# Patient Record
Sex: FEMALE | Race: WHITE | NOT HISPANIC OR LATINO | Employment: FULL TIME | URBAN - METROPOLITAN AREA
[De-identification: names, ages, dates, MRNs, and addresses within clinical notes are randomized per-mention and may not be internally consistent; named-entity substitution may affect disease eponyms.]

---

## 2023-02-19 ENCOUNTER — HOSPITAL ENCOUNTER (EMERGENCY)
Facility: HOSPITAL | Age: 43
Discharge: HOME | End: 2023-02-19
Attending: EMERGENCY MEDICINE

## 2023-02-19 ENCOUNTER — APPOINTMENT (EMERGENCY)
Dept: RADIOLOGY | Facility: HOSPITAL | Age: 43
End: 2023-02-19

## 2023-02-19 VITALS
OXYGEN SATURATION: 99 % | HEART RATE: 64 BPM | RESPIRATION RATE: 16 BRPM | BODY MASS INDEX: 45.57 KG/M2 | DIASTOLIC BLOOD PRESSURE: 74 MMHG | WEIGHT: 257.28 LBS | SYSTOLIC BLOOD PRESSURE: 160 MMHG | TEMPERATURE: 98.6 F

## 2023-02-19 DIAGNOSIS — M79.641 RIGHT HAND PAIN: Primary | ICD-10-CM

## 2023-02-19 RX ORDER — ACETAMINOPHEN 325 MG/1
975 TABLET ORAL ONCE
Status: COMPLETED | OUTPATIENT
Start: 2023-02-19 | End: 2023-02-19

## 2023-02-19 RX ORDER — KETOROLAC TROMETHAMINE 30 MG/ML
30 INJECTION, SOLUTION INTRAMUSCULAR; INTRAVENOUS ONCE
Status: COMPLETED | OUTPATIENT
Start: 2023-02-19 | End: 2023-02-19

## 2023-02-19 RX ADMIN — KETOROLAC TROMETHAMINE 30 MG: 30 INJECTION, SOLUTION INTRAMUSCULAR at 01:10

## 2023-02-19 RX ADMIN — ACETAMINOPHEN 975 MG: 325 TABLET ORAL at 01:10

## 2023-02-19 NOTE — DISCHARGE INSTRUCTIONS
Continue to monitor your right hand pain  As discussed, take 600 mg of ibuprofen 3 times daily for the next 2 days  Use 650 mg of Tylenol 3 times a day for the next 2 days  Return to the ER if you develop fever, worsening pain, new swelling/redness/warmth to the affected area, numbness or tingling, or if you develop a new rash

## 2023-02-19 NOTE — ED PROVIDER NOTES
History  Chief Complaint   Patient presents with   • Hand Pain     Pt C/O R hand pain starting tonight  Denies any injury to hand      Patient is a 51-year-old female presenting for evaluation of R hand pain  Patient notes that she was awoken by sleep tonight due to pain to her right hand between the thumb and index finger  She denies trauma to the hand  She describes a "deep", sharp, constant pain that spikes intermittently with what is described as a nerve like pain  She denies fevers, chills, sweats, redness/warmth to the affected area, and IVDA  She denies prior injury to the hand but notes a few weeks ago a possible "torque" injury when twisting something  She has intermittently had mild pain to the affected area but notes the intensity/severity of tonight's pain is new and significantly different  She notes 3 weeks of rash to the top of the right hand and parts of the left hand and is uncertain if that may be related  She notes intermittent rashes throughout her body which has been investigated by allergy and dermatology specialists  She is right hand dominant  History provided by:  Patient   used: No        None       History reviewed  No pertinent past medical history  History reviewed  No pertinent surgical history  History reviewed  No pertinent family history  I have reviewed and agree with the history as documented  E-Cigarette/Vaping     E-Cigarette/Vaping Substances     Social History     Tobacco Use   • Smoking status: Never   • Smokeless tobacco: Never   Substance Use Topics   • Alcohol use: Yes     Comment: socially   • Drug use: Never       Review of Systems   Constitutional: Negative for chills and fever  Musculoskeletal: Positive for joint swelling (small amount of swelling to area of pain on R hand between thumb and index finger)  Negative for arthralgias, back pain, gait problem, neck pain and neck stiffness     Skin: Positive for rash (to tops of left and right hands)  Negative for color change and wound  Allergic/Immunologic: Negative for immunocompromised state  Physical Exam  Physical Exam  Vitals and nursing note reviewed  Constitutional:       General: She is in acute distress (evidencing mild distress)  Appearance: Normal appearance  She is normal weight  She is not ill-appearing, toxic-appearing or diaphoretic  HENT:      Head: Normocephalic and atraumatic  Jaw: There is normal jaw occlusion  Nose: Nose normal       Mouth/Throat:      Lips: Pink  Mouth: Mucous membranes are moist    Eyes:      Extraocular Movements: Extraocular movements intact  Conjunctiva/sclera: Conjunctivae normal    Cardiovascular:      Rate and Rhythm: Normal rate  Pulses: Normal pulses  Radial pulses are 2+ on the right side and 2+ on the left side  Pulmonary:      Effort: Pulmonary effort is normal  No respiratory distress  Musculoskeletal:         General: Normal range of motion  Right shoulder: Normal       Left shoulder: Normal       Right elbow: Normal       Left elbow: Normal       Right wrist: Normal       Left wrist: Normal       Right hand: Swelling (scant swelling to area marked on image) present  No deformity, tenderness or bony tenderness  Normal range of motion  Normal strength  Normal sensation  There is no disruption of two-point discrimination  Normal capillary refill  Normal pulse  Left hand: Normal         Hands:       Cervical back: Normal range of motion and neck supple  Skin:     General: Skin is warm and dry  Capillary Refill: Capillary refill takes less than 2 seconds  Findings: No wound  Neurological:      General: No focal deficit present  Mental Status: She is alert and oriented to person, place, and time  Mental status is at baseline  GCS: GCS eye subscore is 4  GCS verbal subscore is 5  GCS motor subscore is 6  Sensory: Sensation is intact        Motor: Motor function is intact  Vital Signs  ED Triage Vitals   Temperature Pulse Respirations Blood Pressure SpO2   02/19/23 0024 02/19/23 0024 02/19/23 0024 02/19/23 0024 02/19/23 0024   98 6 °F (37 °C) 80 16 (!) 177/86 100 %      Temp Source Heart Rate Source Patient Position - Orthostatic VS BP Location FiO2 (%)   02/19/23 0024 02/19/23 0024 02/19/23 0024 02/19/23 0157 --   Oral Monitor Sitting Right arm       Pain Score       02/19/23 0024       8           Vitals:    02/19/23 0024 02/19/23 0157   BP: (!) 177/86 160/74   Pulse: 80 64   Patient Position - Orthostatic VS: Sitting Sitting         Visual Acuity      ED Medications  Medications   acetaminophen (TYLENOL) tablet 975 mg (975 mg Oral Given 2/19/23 0110)   ketorolac (TORADOL) injection 30 mg (30 mg Intramuscular Given 2/19/23 0110)       Diagnostic Studies  Results Reviewed     None                 XR hand 3+ views RIGHT   ED Interpretation by ANDRE Elliott (02/19 0134)   No acute bony abnormality identified by me  Procedures  Procedures         ED Course                               SBIRT 22yo+    Flowsheet Row Most Recent Value   SBIRT (23 yo +)    In order to provide better care to our patients, we are screening all of our patients for alcohol and drug use  Would it be okay to ask you these screening questions? Unable to answer at this time Filed at: 02/19/2023 [facility]                    Medical Decision Making  DDx including but not limited to: sprain, strain, arthritis, carpal tunnel syndrome, radiculopathy; considered but doubt septic arthritis or fracture or cellulitis or osteomyelitis or VTE or lyme disease or gout    Patient is a 41-year-old female presenting for evaluation of 1 hour of atraumatic right hand pain  Patient is evidencing mild distress on initial exam   Her vital signs are notable for a mildly elevated BP    Physical exam is notable for a dry eczematous rash to the dorsal aspect of the right hand as well as a small area to the dorsal aspect of the left hand  There is scant swelling, however no warmth, redness, fluctuance, or mass, or other acute abnormality  She has full range of motion of the right wrist and the digits of the hand  She has full strength  Plan made for x-ray to rule out bony abnormality  ED wet read x-ray of right hand without abnormality  Given Tylenol and Toradol for which she had improvement in her pain  She was offered a splint for comfort  I suspect a musculoskeletal cause as most likely underlying pathology, however cannot rule out a neuropathy-like pathology  No evidence of cellulitis, abscess, or infection  No history of gout or erythema/warmth to suggest gout  Plan made for NSAID, Tylenol therapy at home with the use of ice/heat for comfort  Patient and her  agreeable to plan of no further questions at this time  Patient is with improved appearance, downtrending blood pressure after pain control, in no acute distress, and ambulatory with steady gait at time of discharge  Referral to Ortho provided if pain persists  Right hand pain: acute illness or injury  Amount and/or Complexity of Data Reviewed  Radiology: ordered and independent interpretation performed  Risk  OTC drugs  Prescription drug management  Disposition  Final diagnoses:   Right hand pain     Time reflects when diagnosis was documented in both MDM as applicable and the Disposition within this note     Time User Action Codes Description Comment    2/19/2023  1:51 AM Benjamin Muller Add [O00 804] Right hand pain       ED Disposition     ED Disposition   Discharge    Condition   Stable    Date/Time   Sun Feb 19, 2023  1:51 AM    Sade Herron discharge to home/self care                 Follow-up Information     Follow up With Specialties Details Why Contact Info Additional [address] [facility] Schedule an appointment as soon as possible for a visit in 2 days  [address]  [phone]       [address] [facility] Schedule an appointment as soon as possible for a visit in 2 days  [address] [facility] [address] [place] [address] [facility] [address], [address], [address] [place], [place], [place]    [address] Emergency Department Emergency Medicine Go to  If symptoms worsen [address] [facility] [address] [facility] Emergency Department, [address], [place], [address] [facility], [address]          There are no discharge medications for this patient  No discharge procedures on file      PDMP Review     None          ED Provider  Electronically Signed by           Julissa Shelley  02/19/23 8359

## 2023-09-06 ENCOUNTER — OFFICE VISIT (OUTPATIENT)
Dept: OBGYN CLINIC | Facility: CLINIC | Age: 43
End: 2023-09-06

## 2023-09-06 VITALS
HEIGHT: 65 IN | HEART RATE: 66 BPM | BODY MASS INDEX: 41.75 KG/M2 | WEIGHT: 250.6 LBS | DIASTOLIC BLOOD PRESSURE: 92 MMHG | SYSTOLIC BLOOD PRESSURE: 129 MMHG

## 2023-09-06 DIAGNOSIS — L29.2 VULVAR ITCHING: Primary | ICD-10-CM

## 2023-09-06 DIAGNOSIS — L28.0 LICHEN SIMPLEX: ICD-10-CM

## 2023-09-06 LAB
BV WHIFF TEST VAG QL: NEGATIVE
CLUE CELLS SPEC QL WET PREP: NEGATIVE
PH SMN: 4 [PH]
SL AMB POCT WET MOUNT: NORMAL
T VAGINALIS VAG QL WET PREP: NEGATIVE
YEAST VAG QL WET PREP: NEGATIVE

## 2023-09-06 PROCEDURE — 87210 SMEAR WET MOUNT SALINE/INK: CPT | Performed by: NURSE PRACTITIONER

## 2023-09-06 PROCEDURE — 99203 OFFICE O/P NEW LOW 30 MIN: CPT | Performed by: NURSE PRACTITIONER

## 2023-09-06 RX ORDER — CETIRIZINE HYDROCHLORIDE 5 MG/1
5 TABLET ORAL DAILY
COMMUNITY

## 2023-09-06 RX ORDER — MELATONIN
1000 DAILY
COMMUNITY

## 2023-09-06 RX ORDER — CARVEDILOL 6.25 MG/1
6.25 TABLET ORAL 2 TIMES DAILY WITH MEALS
COMMUNITY

## 2023-09-06 RX ORDER — TRIAMCINOLONE ACETONIDE 1 MG/G
CREAM TOPICAL 2 TIMES DAILY
Qty: 80 G | Refills: 1 | Status: SHIPPED | OUTPATIENT
Start: 2023-09-06

## 2023-09-06 NOTE — PROGRESS NOTES
PROBLEM GYNECOLOGICAL VISIT    Beryle Knoll is a 43 y.o. female who presents today with complaint of vulvar itching. Her general medical history has been reviewed and she reports it as follows:    History reviewed. No pertinent past medical history. History reviewed. No pertinent surgical history. OB History        0    Para   0    Term   0       0    AB   0    Living   0       SAB   0    IAB   0    Ectopic   0    Multiple   0    Live Births   0               Social History     Tobacco Use   • Smoking status: Never   • Smokeless tobacco: Never   Substance Use Topics   • Alcohol use: Not Currently     Comment: socially   • Drug use: Never     Social History     Substance and Sexual Activity   Sexual Activity Yes   • Partners: Male   • Birth control/protection: None, Male Sterilization       Current Outpatient Medications   Medication Instructions   • carvedilol (COREG) 6.25 mg, Oral, 2 times daily with meals   • cetirizine (ZYRTEC) 5 mg, Oral, Daily   • cholecalciferol (VITAMIN D3) 1,000 Units, Oral, Daily       History of Present Illness:   Nevaeh Louie presents today reporting issues with vulvar itching. This has been occurring for approximately 6 months. She initially though that she had a yeast infection and tried OTC monistat, which did not relieve the itching and caused worsening burning/irritation. She was given an Rx for Diflucan by her PCP which did not improve the itching. She denies any abnormal vaginal discharge or odor. She has been in a long term monogamous relationship with her  of many years. She denies any change in hygiene products. Review of Systems:  Review of Systems   Constitutional: Negative. Gastrointestinal: Negative. Genitourinary: Negative for dysuria, frequency, vaginal discharge and vaginal pain.         Vulvar itching        Physical Exam:  /92 (BP Location: Right arm)   Pulse 66   Ht 5' 5" (1.651 m)   Wt 114 kg (250 lb 9.6 oz)   LMP 2023 (Exact Date)   BMI 41.70 kg/m²   Physical Exam  Constitutional:       General: She is not in acute distress. Appearance: Normal appearance. Genitourinary:      No lesions in the vagina. No vaginal discharge, erythema, tenderness, bleeding or ulceration. No cervical lesion. Neurological:      Mental Status: She is alert. Skin:     General: Skin is warm and dry. Psychiatric:         Mood and Affect: Mood normal.         Behavior: Behavior normal.   Vitals reviewed. Point of Care Testing:   -Wet mount: -yeast, -clue cells   -KOH mount: -yeast   -Whiff: negative    -pH 4    Assessment:   1. Vulvar itching    2. Lichen simplex     Plan:   1. Discussed itch/scratch cycle causing long term skin irritation. 2. Discussed skin irritants and potential triggers, such as excess sweating and dampness in the area. She has been helping to care for her mother in law over this year and states that her home is very hot, which she has noticed has created excess sweating/mositure. 3. Will begin topical steroids twice per day over the next 3 weeks, then tapering back off.    4. She is scheduled later this month for her annual exam with the OBGYN office she typically sees near her home in [place], will plan to follow up for symptoms at that time. Reviewed with patient that test results are available in [email or web address] immediately, but that they will not necessarily be reviewed by me immediately. Explained that I will review results at my earliest opportunity and contact patient appropriately.

## 2024-01-23 ENCOUNTER — HOSPITAL ENCOUNTER (EMERGENCY)
Facility: HOSPITAL | Age: 44
Discharge: HOME | End: 2024-01-23
Attending: EMERGENCY MEDICINE
Payer: COMMERCIAL

## 2024-01-23 VITALS
SYSTOLIC BLOOD PRESSURE: 184 MMHG | OXYGEN SATURATION: 99 % | HEART RATE: 69 BPM | RESPIRATION RATE: 20 BRPM | DIASTOLIC BLOOD PRESSURE: 80 MMHG | TEMPERATURE: 97.6 F

## 2024-01-23 DIAGNOSIS — M54.30 SCIATICA: Primary | ICD-10-CM

## 2024-01-23 PROCEDURE — 99282 EMERGENCY DEPT VISIT SF MDM: CPT

## 2024-01-23 PROCEDURE — 96372 THER/PROPH/DIAG INJ SC/IM: CPT

## 2024-01-23 PROCEDURE — 99284 EMERGENCY DEPT VISIT MOD MDM: CPT | Performed by: EMERGENCY MEDICINE

## 2024-01-23 RX ORDER — KETOROLAC TROMETHAMINE 30 MG/ML
15 INJECTION, SOLUTION INTRAMUSCULAR; INTRAVENOUS ONCE
Status: COMPLETED | OUTPATIENT
Start: 2024-01-23 | End: 2024-01-23

## 2024-01-23 RX ORDER — LIDOCAINE 50 MG/G
1 PATCH TOPICAL ONCE
Status: DISCONTINUED | OUTPATIENT
Start: 2024-01-23 | End: 2024-01-24 | Stop reason: HOSPADM

## 2024-01-23 RX ADMIN — KETOROLAC TROMETHAMINE 15 MG: 30 INJECTION, SOLUTION INTRAMUSCULAR; INTRAVENOUS at 22:49

## 2024-01-23 RX ADMIN — LIDOCAINE 1 PATCH: 700 PATCH TOPICAL at 22:50

## 2024-01-24 ENCOUNTER — NURSE TRIAGE (OUTPATIENT)
Dept: PHYSICAL THERAPY | Facility: OTHER | Age: 44
End: 2024-01-24

## 2024-01-24 DIAGNOSIS — M54.42 ACUTE LEFT-SIDED LOW BACK PAIN WITH LEFT-SIDED SCIATICA: Primary | ICD-10-CM

## 2024-01-24 NOTE — TELEPHONE ENCOUNTER
"Patient called into [facility] today and left v/m @7:06am. In regards her back/sciatica pain .    Returned patient's call @8:10am.      Additional Information   Negative: Is this related to a work injury?   Negative: Is this related to an MVA?   Negative: Are you currently recieving homecare services?    Background - Initial Assessment  Clinical complaint: ED visit yesterday 01/23 due to Left Sided lower Back Pain. Patient states this is new, pain started in the morning  radiating into her buttock \"a little bit\", mostly in her hip and down to mid-thigh. No numbness or tingling . NKI. Not seeing a Dr for this pain. Patient states pain is constant, worse with certain movement. Patient described pain as sharp and this morning felt stiff when she woke up.   Date of onset: 01/23/24  Frequency of pain: constant  Quality of pain: sharp and stiff.    Protocols used: [facility] Protocol    "

## 2024-01-24 NOTE — TELEPHONE ENCOUNTER
Additional Information   Negative: Has the patient had unexplained weight loss?   Negative: Does the patient have a fever?   Negative: Is the patient experiencing urine retention?   Negative: Is the patient experiencing acute drop foot or paralysis?   Negative: Has the patient experienced major trauma? (fall from height, high speed collision, direct blow to spine) and is also experiencing nausea, light-headedness, or loss of consciousness?   Negative: Is the patient experiencing blood in sputum?   Negative: Is this a chronic condition?    Protocols used: [facility] Protocol    This RN did review in detail the [facility] and what we can provide for their back pain.  Patient is agreeable to being triaged by this RN and would like to proceed with Physical Therapy.    Referral was placed for Physical Therapy at the [place] site. Patients information was sent to the  to make evaluation appointment. Patient made aware that the PT office  will be calling to schedule the appointment.  Patient was provided with the phone number to the PT office.    No further questions and/or concerns were voiced by the patient at this time. Patient states understanding of the referral that was placed.    Referral Closed.

## 2024-01-24 NOTE — ED ATTENDING ATTESTATION
1/23/2024  IMelani MD, saw and evaluated the patient. I have discussed the patient with the resident/non-physician practitioner and agree with the resident's/non-physician practitioner's findings, Plan of Care, and MDM as documented in the resident's/non-physician practitioner's note, except where noted. All available labs and Radiology studies were reviewed.  I was present for key portions of any procedure(s) performed by the resident/non-physician practitioner and I was immediately available to provide assistance.       At this point I agree with the current assessment done in the Emergency Department.  I have conducted an independent evaluation of this patient a history and physical is as follows:    43-year-old presenting with lower back pain radiating down the leg.  No trauma.  No falls.  No fevers.  No IV drug use.  No abdominal pain.  No bowel or urine incontinence.  No weakness.  No groin paresthesias.    Neuro intact on exam.    Patient with sciatica.  Symptomatic control.  Discussed importance of following up with spine program.  Patient verbalized understanding.  Strict return precautions for signs of cauda equina explained to patient    ED Course         Critical Care Time  Procedures

## 2024-01-24 NOTE — ED PROVIDER NOTES
History  Chief Complaint   Patient presents with    Back Pain     Pt reports left lower back pain. Seen at urgent care and given Toradol and muscle relaxant, dx with sciatica. Pt had a little relief. Tonight when pt got up to go up to bed 10/10 pain, pain is worse with movement. Denies difficulty urinating.      HPI  Patient is a 43-year-old female with past medical history of hypertension.  She is presenting with 1 day of lower left-sided back pain.  She states that earlier this morning she began having back pain after doing daily tasks.  She went to [facility] urgent care where they gave her Toradol and Robaxin for pain relief.  This was effective.  Later on in the evening at home her back pain came back and was described as a 10 out of 10 back pain with radiation down the left leg to mid thigh.  She is denying any saddle paresthesias, urinary incontinence, or fecal incontinence.  She is denying weakness.  She is denying fever or any recent illnesses.  She recently traveled to [place] last week and returned Sunday but did not note any back pain following her travels.    Prior to Admission Medications   Prescriptions Last Dose Informant Patient Reported? Taking?   carvedilol (COREG) 6.25 mg tablet  Self Yes No   Sig: Take 6.25 mg by mouth 2 (two) times a day with meals   cetirizine (ZyrTEC) 5 MG tablet  Self Yes No   Sig: Take 5 mg by mouth daily   cholecalciferol (VITAMIN D3) 1,000 units tablet  Self Yes No   Sig: Take 1,000 Units by mouth daily   triamcinolone (KENALOG) 0.1 % cream   No No   Sig: Apply topically 2 (two) times a day Apply topically 2 times a day for three weeks, followed by once a day for a week, followed by every other day for one week.      Facility-Administered Medications: None       History reviewed. No pertinent past medical history.    History reviewed. No pertinent surgical history.    History reviewed. No pertinent family history.  I have reviewed and agree with the history as  documented.    E-Cigarette/Vaping     E-Cigarette/Vaping Substances     Social History     Tobacco Use    Smoking status: Never    Smokeless tobacco: Never   Substance Use Topics    Alcohol use: Not Currently     Comment: socially    Drug use: Never        Review of Systems   Constitutional:  Negative for chills, fatigue and fever.   HENT:  Negative for congestion, rhinorrhea and sore throat.    Respiratory:  Negative for cough and shortness of breath.    Cardiovascular:  Negative for chest pain and leg swelling.   Gastrointestinal:  Negative for abdominal pain, constipation, diarrhea, nausea and vomiting.   Genitourinary:  Negative for dysuria, enuresis, frequency and urgency.   Musculoskeletal:  Positive for back pain.   Neurological:  Negative for dizziness, weakness, numbness and headaches.       Physical Exam  ED Triage Vitals [01/23/24 2139]   Temperature Pulse Respirations Blood Pressure SpO2   97.6 °F (36.4 °C) 69 20 (!) 184/80 99 %      Temp Source Heart Rate Source Patient Position - Orthostatic VS BP Location FiO2 (%)   Oral Monitor -- Right arm --      Pain Score       6             Orthostatic Vital Signs  Vitals:    01/23/24 2139   BP: (!) 184/80   Pulse: 69       Physical Exam  Constitutional:       Appearance: She is obese.   Cardiovascular:      Rate and Rhythm: Normal rate and regular rhythm.      Heart sounds: Normal heart sounds.   Pulmonary:      Effort: Pulmonary effort is normal.      Breath sounds: Normal breath sounds.   Musculoskeletal:      Lumbar back: No swelling, edema, deformity, spasms, tenderness or bony tenderness. Normal range of motion.   Neurological:      General: No focal deficit present.      Mental Status: She is alert and oriented to person, place, and time.      Motor: No weakness.      Gait: Gait normal.         ED Medications  Medications   lidocaine (LIDODERM) 5 % patch 1 patch (1 patch Topical Medication Applied 1/23/24 2250)   ketorolac (TORADOL) injection 15 mg (15 mg  Intramuscular Given 1/23/24 7103)       Diagnostic Studies  Results Reviewed       None                   No orders to display         Procedures  Procedures      ED Course                                       Medical Decision Making  Risk  Prescription drug management.          Disposition  Final diagnoses:   Sciatica     Time reflects when diagnosis was documented in both MDM as applicable and the Disposition within this note       Time User Action Codes Description Comment    1/23/2024 10:37 PM Denys Steele Add [M54.30] Sciatica           ED Disposition       ED Disposition   Discharge    Condition   Stable    Date/Time   Tue Jan 23, 2024 10:57 PM    Comment   Abby Jonas discharge to home/self care.                   Follow-up Information       Follow up With Specialties Details Why Contact Info Additional Information    Roosevelt Ramirez MD Internal Medicine Schedule an appointment as soon as possible for a visit in 1 week  [address] [place] [address]  [phone]       [facility] Emergency Department Emergency Medicine Go to  As needed, If symptoms worsen [address] [place] [id number]  [phone] [facility] Emergency Department, [address] [place] [address], [facility]            Discharge Medication List as of 1/23/2024 10:57 PM        CONTINUE these medications which have NOT CHANGED    Details   carvedilol (COREG) 6.25 mg tablet Take 6.25 mg by mouth 2 (two) times a day with meals, Historical Med      cetirizine (ZyrTEC) 5 MG tablet Take 5 mg by mouth daily, Historical Med      cholecalciferol (VITAMIN D3) 1,000 units tablet Take 1,000 Units by mouth daily, Historical Med      triamcinolone (KENALOG) 0.1 % cream Apply topically 2 (two) times a day Apply topically 2 times a day for three weeks, followed by once a day for a week, followed by every other day for one week., Starting Wed 9/6/2023,  Normal               PDMP Review       None             ED Provider  Attending physically available and evaluated Abby Jonas. I managed the patient along with the ED Attending.    Electronically Signed by           Denys Steele,   01/23/24 4914       Denys Steele,   01/23/24 1940

## 2024-02-02 ENCOUNTER — EVALUATION (OUTPATIENT)
Dept: PHYSICAL THERAPY | Facility: CLINIC | Age: 44
End: 2024-02-02
Payer: COMMERCIAL

## 2024-02-02 DIAGNOSIS — M54.42 ACUTE LEFT-SIDED LOW BACK PAIN WITH LEFT-SIDED SCIATICA: Primary | ICD-10-CM

## 2024-02-02 PROCEDURE — 97161 PT EVAL LOW COMPLEX 20 MIN: CPT | Performed by: PHYSICAL THERAPIST

## 2024-02-02 PROCEDURE — 97110 THERAPEUTIC EXERCISES: CPT | Performed by: PHYSICAL THERAPIST

## 2024-02-02 NOTE — PROGRESS NOTES
PT Evaluation     Today's date: 2024  Patient name: Abby Jonas  : 1980  MRN: [id number]  Referring provider: No ref. provider found  Dx:   Encounter Diagnosis     ICD-10-CM    1. Acute left-sided low back pain with left-sided sciatica  M54.42                      Assessment  Assessment details: Patient presents with signs and symptoms consistent with mechanical back pain and L sided posterior derangement.  Initially she presents with a likely relevant L lateral compartment with most improvement seen with L Sidegliding movements.   Positive prognostic indicators include:  Motivated to improve, symptoms/presentation improved somewhat today.  Negative prognostic indicators include: (-0)  She presents with: pain, decreased: ROM, strength and  functional capacity.  She requires skilled PT to address these deficits and restore maximal functional capacity.  Patient presents as part of the [facility].    Impairments: abnormal or restricted ROM, activity intolerance, impaired physical strength, lacks appropriate home exercise program and pain with function  Understanding of Dx/Px/POC: good   Prognosis: good    Goals  ST-6 weeks  1.  Patient to be independent with HEP.  2.  Decrease pain at least 2 subjective levels.      LT-12 weeks.    1.   Patient to voice comfort with self management of condition.  2.  75% or > decreased pain.  3.  75% or > decreased functional deficits.  4.  Normalize AROM of all deficit planes.  7.  Patient to voice understanding of activities/positions to avoid.  8.  Centralize symptoms.      Plan  Patient would benefit from: skilled PT  Referral necessary: No  Planned modality interventions: cryotherapy  Planned therapy interventions: IADL retraining, joint mobilization, manual therapy, motor coordination training, neuromuscular re-education, patient education, postural training, self care, strengthening, stretching, therapeutic activities, therapeutic  "exercise, home exercise program, flexibility, ADL training, balance and body mechanics training  Frequency: 2x week  Duration in weeks: 6  Treatment plan discussed with: patient        Subjective Evaluation    History of Present Illness  Onset date: 24.  Mechanism of injury: Chief Complaint:  L LBP    History:  Pt had sharp onset of L sided LBP with turning/bending with laundry.  She was seen in ER, had a Torodol injection.  No imaging and was referred to PT via [facility].  Symptoms have improved since onset.  She denies prior history or treatment.  She works from home with some sitting    PMH: (-)     Aggravating factors: sitting, prolonged positioning, upon waking  Relieving factors:  \"I haven't figured it out\"    Functional Deficits:  riding [employer], walking for fitness (< 1 mile, wants to go 4-5)    Patient Goals:  \"get rid of pain\"    Quality of life: good    Pain  At best pain ratin  At worst pain ratin  Location: L LBP at times L LE to lateral shin          Objective     Postural Observations  Seated posture: fair  Correction of posture: has no consistent effect      Active Range of Motion     Lumbar   Flexion:  WFL  Extension:  Restriction level: moderate    Additional Active Range of Motion Details  B sideglide WFL    Joint Play     Pain: L3, L4, L5 and S1   Mechanical Assessment    Cervical      Thoracic    Lying extension: repeated movements  Pain intensity: worse  Pain level: increased    Lumbar    Left Sidegliding: repeated movements  Pain intensity: better  Pain level: decreased    General Comments:      Lumbar Comments  (-) Neuromotor exam  (-) Hip Screen  (-) TULOI/FADIR             Precautions: (-)      Manuals 2/2            L Flex Rot prn             L UPA prn             Prone Lx PA Mobs prn                          Neuro Re-Ed             Posture Ed/Lx Roll                                                                                           Ther Ex           "   HEP: L SGIS 1x10 every 2 hrs             EIL             EIL L SG             EIL pt OP             EIL L SG pt OP             Sust Ext (L SG)             EIS             EIS Belt OP             L SGIS             Ther Activity                                       Gait Training                                       Modalities

## 2024-02-05 ENCOUNTER — OFFICE VISIT (OUTPATIENT)
Dept: PHYSICAL THERAPY | Facility: CLINIC | Age: 44
End: 2024-02-05
Payer: COMMERCIAL

## 2024-02-05 DIAGNOSIS — M54.42 ACUTE LEFT-SIDED LOW BACK PAIN WITH LEFT-SIDED SCIATICA: Primary | ICD-10-CM

## 2024-02-05 PROCEDURE — 97110 THERAPEUTIC EXERCISES: CPT | Performed by: PHYSICAL THERAPIST

## 2024-02-05 NOTE — PROGRESS NOTES
Daily Note     Today's date: 2024  Patient name: Abyb Jonas  : 1980  MRN: [id number]  Referring provider: Phil Suresh, PT  Dx:   Encounter Diagnosis     ICD-10-CM    1. Acute left-sided low back pain with left-sided sciatica  M54.42                      Subjective: Pt feeling nearly 100% better, virtually pain free since IE      Objective: See treatment diary below.  Pt ed in HEP (EIL 1x10 every 2-3 hrs) as well as posture and self management (back account)      Assessment: Tolerated treatment well. Patient would benefit from continued PT      Plan: Continue per plan of care.      Precautions: (-)      Manuals / 2           L Flex Rot prn             L UPA prn             Prone Lx PA Mobs prn                          Neuro Re-Ed             Posture Ed/Lx Roll  Pt ed                                                                                         Ther Ex             HEP: L SGIS 1x10 every 2 hrs  EIL 1x10 every 2-3 hrs           EIL  20x           EIL L SG             EIL pt OP  10x           EIL L SG pt OP             Sust Ext (L SG)             EIS             EIS Belt OP             L SGIS             Ther Activity                                       Gait Training                                       Modalities                                             84F yo with pmhx of CAD s/p CABG x 3, HTN, HLD, NIDDM, AF s/p PPM on Eliquis, TAVR on 2/18, COPD (home O2), hypothyroidism who presents with symptoms of TIA/stroke, rt sided weakness. exam remarkable for nml s1s2 no mrg, cta bl no wrr sntnd+bs no cce weakness RLE>RUE, aaox3 but mildly confused. r/o leaflet thrombosis despite eliquis with questionable compliance with TTE/MOHINI; EP interrogation of PPM; neuro w/u pending.

## 2024-02-08 ENCOUNTER — OFFICE VISIT (OUTPATIENT)
Dept: INTERNAL MEDICINE CLINIC | Facility: CLINIC | Age: 44
End: 2024-02-08
Payer: COMMERCIAL

## 2024-02-08 VITALS
TEMPERATURE: 97 F | HEART RATE: 73 BPM | OXYGEN SATURATION: 99 % | BODY MASS INDEX: 42.49 KG/M2 | SYSTOLIC BLOOD PRESSURE: 156 MMHG | WEIGHT: 255 LBS | HEIGHT: 65 IN | DIASTOLIC BLOOD PRESSURE: 90 MMHG

## 2024-02-08 DIAGNOSIS — E03.9 ACQUIRED HYPOTHYROIDISM: ICD-10-CM

## 2024-02-08 DIAGNOSIS — E66.01 CLASS 3 SEVERE OBESITY DUE TO EXCESS CALORIES WITHOUT SERIOUS COMORBIDITY WITH BODY MASS INDEX (BMI) OF 40.0 TO 44.9 IN ADULT (HCC): ICD-10-CM

## 2024-02-08 DIAGNOSIS — E55.9 VITAMIN D DEFICIENCY: ICD-10-CM

## 2024-02-08 DIAGNOSIS — Z00.00 LABORATORY EXAMINATION ORDERED AS PART OF A ROUTINE GENERAL MEDICAL EXAMINATION: ICD-10-CM

## 2024-02-08 DIAGNOSIS — I10 ESSENTIAL HYPERTENSION: ICD-10-CM

## 2024-02-08 DIAGNOSIS — Z12.31 ENCOUNTER FOR SCREENING MAMMOGRAM FOR BREAST CANCER: ICD-10-CM

## 2024-02-08 DIAGNOSIS — I49.3 PVC (PREMATURE VENTRICULAR CONTRACTION): ICD-10-CM

## 2024-02-08 DIAGNOSIS — M54.42 ACUTE LEFT-SIDED LOW BACK PAIN WITH LEFT-SIDED SCIATICA: Primary | ICD-10-CM

## 2024-02-08 DIAGNOSIS — Z71.9 HEALTH COUNSELING: ICD-10-CM

## 2024-02-08 DIAGNOSIS — K21.9 GASTROESOPHAGEAL REFLUX DISEASE, UNSPECIFIED WHETHER ESOPHAGITIS PRESENT: ICD-10-CM

## 2024-02-08 DIAGNOSIS — Z79.899 ENCOUNTER FOR LONG-TERM CURRENT USE OF MEDICATION: ICD-10-CM

## 2024-02-08 PROCEDURE — 99204 OFFICE O/P NEW MOD 45 MIN: CPT | Performed by: INTERNAL MEDICINE

## 2024-02-08 RX ORDER — LEVOTHYROXINE SODIUM 0.05 MG/1
50 TABLET ORAL DAILY
COMMUNITY
Start: 2024-01-10

## 2024-02-08 NOTE — ASSESSMENT & PLAN NOTE
BP slightly elevated today, home BP readings normal.  Instructed to monitor BP at home, taking carvedilol 6.25 mg bid.  She reports resting HR of 55. If needed, consider adding ARB, does not want diuretic.

## 2024-02-08 NOTE — PROGRESS NOTES
Assessment/Plan:    Acquired hypothyroidism  Stable.    GERD (gastroesophageal reflux disease)  Occasional symptoms.    Essential hypertension  BP slightly elevated today, home BP readings normal.  Instructed to monitor BP at home, taking carvedilol 6.25 mg bid.  She reports resting HR of 55. If needed, consider adding ARB, does not want diuretic.    Vitamin D deficiency  Taking D3.    Class 3 severe obesity due to excess calories without serious comorbidity with body mass index (BMI) of 40.0 to 44.9 in adult ([facility])  Gained weight in the last year or so.  Resume regular exercise, monitor calorie intake.  Discussed importance of regular meals and adequate sleep.    PVC (premature ventricular contraction)  Intermittent symptoms. On carvedilol.       Diagnoses and all orders for this visit:    Acute left-sided low back pain with left-sided sciatica  Comments:  Improved, went to physical therapy. Remineded of proper body mechanics.    Encounter for screening mammogram for breast cancer  -     Mammo screening bilateral w 3d & cad; Future    Laboratory examination ordered as part of a routine general medical examination  -     CBC and differential; Future  -     Comprehensive metabolic panel; Future  -     Lipid panel; Future  -     TSH, 3rd generation with Free T4 reflex; Future  -     Vitamin B12; Future  -     Vitamin D 25 hydroxy; Future    Essential hypertension  -     CBC and differential; Future  -     Comprehensive metabolic panel; Future  -     Lipid panel; Future    Acquired hypothyroidism  -     TSH, 3rd generation with Free T4 reflex; Future    Vitamin D deficiency  -     Vitamin D 25 hydroxy; Future    Class 3 severe obesity due to excess calories without serious comorbidity with body mass index (BMI) of 40.0 to 44.9 in adult ([facility])    Encounter for long-term current use of medication  -     Vitamin B12; Future    Gastroesophageal reflux disease, unspecified whether esophagitis present    PVC (premature  ventricular contraction)    Health counseling  Comments:  Vaccinations updated. Mammogram due in the fall.    Other orders  -     levothyroxine 50 mcg tablet; Take 50 mcg by mouth daily      Follow up in 6 months or as needed.      Subjective:      Patient ID: Abby Jonas is a 43 y.o. female here f    She was seen at the ER for back pain.  She thinks she may have developed this since she was lifting a lot of boxes since the move to the area permanently a few months ago. She reports pain has significantly improved after 2 sessions of physical therapy.    She is worried about her blood pressure.  She reports high readings when she is at the ER or at the doctor's office.  At home, she reports blood pressure would range between 130-135/80-85.    She has history of PVC's.  She experiences palpitations occasionally.  She was started on a beta-blocker to help with this.  Denies any chest pain or pressure, no shortness of breath.    She continues to struggle with her weight.  She reports gaining about 25 pounds since 1.5 years ago.  She had a good schedule prior to this, was exercising regularly.  She has always struggled with weight since she was a child.      The following portions of the patient's history were reviewed and updated as appropriate: allergies, current medications, past family history, past medical history, past social history, past surgical history, and problem list.    Review of Systems   Constitutional:  Negative for appetite change and fatigue.   HENT:  Negative for congestion, ear pain and postnasal drip.    Eyes:  Negative for visual disturbance.   Respiratory:  Negative for cough and shortness of breath.    Cardiovascular:  Positive for palpitations. Negative for chest pain and leg swelling.   Gastrointestinal:  Negative for abdominal pain, constipation and diarrhea.   Genitourinary:  Negative for dysuria, frequency and urgency.   Musculoskeletal:  Positive for back pain. Negative for arthralgias  "and myalgias.   Skin:  Negative for rash and wound.   Neurological:  Negative for dizziness, numbness and headaches.   Hematological:  Does not bruise/bleed easily.   Psychiatric/Behavioral:  Negative for confusion. The patient is not nervous/anxious.          Objective:      /90   Pulse 73   Temp (!) 97 °F (36.1 °C)   Ht 5' 5\" (1.651 m)   Wt 116 kg (255 lb)   SpO2 99%   BMI 42.43 kg/m²          Physical Exam  Vitals and nursing note reviewed.   Constitutional:       General: She is not in acute distress.     Appearance: She is well-developed.   HENT:      Head: Normocephalic and atraumatic.      Right Ear: Tympanic membrane, ear canal and external ear normal.      Left Ear: Tympanic membrane, ear canal and external ear normal.      Nose: Nose normal.      Mouth/Throat:      Mouth: Mucous membranes are moist.      Pharynx: Uvula midline.   Eyes:      Conjunctiva/sclera: Conjunctivae normal.      Pupils: Pupils are equal, round, and reactive to light.   Neck:      Thyroid: No thyroid mass.   Cardiovascular:      Rate and Rhythm: Normal rate and regular rhythm.      Heart sounds: Normal heart sounds.   Pulmonary:      Effort: Pulmonary effort is normal.      Breath sounds: Normal breath sounds. No wheezing or rhonchi.   Abdominal:      General: Bowel sounds are normal.      Palpations: Abdomen is soft.      Tenderness: There is no abdominal tenderness.      Hernia: No hernia is present.   Musculoskeletal:         General: Normal range of motion.      Cervical back: Neck supple.      Right lower leg: No edema.      Left lower leg: No edema.      Comments: No joint pain or swelling   Lymphadenopathy:      Cervical: No cervical adenopathy.      Upper Body:      Right upper body: No supraclavicular adenopathy.      Left upper body: No supraclavicular adenopathy.   Skin:     General: Skin is warm.      Findings: No rash.   Neurological:      General: No focal deficit present.      Mental Status: She is alert " and oriented to person, place, and time.      Cranial Nerves: No cranial nerve deficit.   Psychiatric:         Mood and Affect: Mood normal.         Behavior: Behavior normal.           Reviewed available records.

## 2024-02-08 NOTE — ASSESSMENT & PLAN NOTE
Gained weight in the last year or so.  Resume regular exercise, monitor calorie intake.  Discussed importance of regular meals and adequate sleep.

## 2024-02-09 ENCOUNTER — OFFICE VISIT (OUTPATIENT)
Dept: PHYSICAL THERAPY | Facility: CLINIC | Age: 44
End: 2024-02-09
Payer: COMMERCIAL

## 2024-02-09 DIAGNOSIS — M54.42 ACUTE LEFT-SIDED LOW BACK PAIN WITH LEFT-SIDED SCIATICA: Primary | ICD-10-CM

## 2024-02-09 PROCEDURE — 97110 THERAPEUTIC EXERCISES: CPT | Performed by: PHYSICAL THERAPIST

## 2024-02-09 NOTE — PROGRESS NOTES
Daily Note     Today's date: 2024  Patient name: Abby Jonas  : 1980  MRN: [id number]  Referring provider: Phil Suresh PT  Dx:   Encounter Diagnosis     ICD-10-CM    1. Acute left-sided low back pain with left-sided sciatica  M54.42                      Subjective: Pt has been feeling better, no pain yesterday but did exercises less frequently and awoke with pain today       Objective: See treatment diary below      Assessment: Tolerated treatment well. Patient  had no pain post end of tx, responded well to EIS today, so ed in performance of this as alternate to EIL at home.   Derangement reduced but not fully stable yet.       Plan: Continue per plan of care.      Precautions: (-)      Manuals 2/2 2/ 2          L Flex Rot prn             L UPA prn             Prone Lx PA Mobs prn                          Neuro Re-Ed             Posture Ed/Lx Roll  Pt ed                                                                                         Ther Ex             HEP: L SGIS 1x10 every 2 hrs  EIL 1x10 every 2-3 hrs EIS/EIL          EIL  20x 30x          EIL L SG             EIL pt OP  10x 10x          EIL L SG pt OP             Sust Ext (L SG)             EIS   20x          EIS Belt OP   10x          L SGIS             Ther Activity                                       Gait Training                                       Modalities

## 2024-02-12 ENCOUNTER — OFFICE VISIT (OUTPATIENT)
Dept: PHYSICAL THERAPY | Facility: CLINIC | Age: 44
End: 2024-02-12
Payer: COMMERCIAL

## 2024-02-12 DIAGNOSIS — M54.42 ACUTE LEFT-SIDED LOW BACK PAIN WITH LEFT-SIDED SCIATICA: Primary | ICD-10-CM

## 2024-02-12 PROCEDURE — 97110 THERAPEUTIC EXERCISES: CPT | Performed by: PHYSICAL THERAPIST

## 2024-02-12 NOTE — PROGRESS NOTES
Daily Note     Today's date: 2024  Patient name: Abby Jonas  : 1980  MRN: [id number]  Referring provider: Phil Suresh PT  Dx:   Encounter Diagnosis     ICD-10-CM    1. Acute left-sided low back pain with left-sided sciatica  M54.42                      Subjective: Pt had some pain Sat, but felt better yesterday.  1-2/10 pain to start today.  Pain worse upon waking, decreased sitting tolerance       Objective: See treatment diary below      Assessment: Tolerated treatment well. Patient would benefit from continued PT.  Pain free post tx.        Plan: Continue per plan of care.      Precautions: (-)      Manuals          L Flex Rot prn             L UPA prn             Prone Lx PA Mobs prn                          Neuro Re-Ed             Posture Ed/Lx Roll  Pt ed                                                                                         Ther Ex             HEP: L SGIS 1x10 every 2 hrs  EIL 1x10 every 2-3 hrs EIS/EIL          EIL  20x 30x 30x         EIL L SG             EIL pt OP  10x 10x          EIL L SG pt OP             Sust Ext (L SG)    5 min         EIS   20x 20x         EIS Belt OP   10x 20x         L SGIS             Ther Activity                                       Gait Training                                       Modalities

## 2024-02-14 ENCOUNTER — OFFICE VISIT (OUTPATIENT)
Dept: PHYSICAL THERAPY | Facility: CLINIC | Age: 44
End: 2024-02-14
Payer: COMMERCIAL

## 2024-02-14 DIAGNOSIS — M54.42 ACUTE LEFT-SIDED LOW BACK PAIN WITH LEFT-SIDED SCIATICA: Primary | ICD-10-CM

## 2024-02-14 PROCEDURE — 97110 THERAPEUTIC EXERCISES: CPT | Performed by: PHYSICAL THERAPIST

## 2024-02-14 NOTE — PROGRESS NOTES
Daily Note     Today's date: 2024  Patient name: Abby Jonas  : 1980  MRN: [id number]  Referring provider: Phil Suresh, PT  Dx:   Encounter Diagnosis     ICD-10-CM    1. Acute left-sided low back pain with left-sided sciatica  M54.42                      Subjective: Patient virtually pain free since last visit, minor pain in am and with prolonged sitting previously, but improving      Objective: See treatment diary below.  Reviewed HEP and prevention exercises.        Assessment: Tolerated treatment well. Patient  has virtually resolved derangement.       Plan:  Return to PT prn     Precautions: (-)      Manuals         L Flex Rot prn             L UPA prn             Prone Lx PA Mobs prn                          Neuro Re-Ed             Posture Ed/Lx Roll  Pt ed                                                                                         Ther Ex             HEP: L SGIS 1x10 every 2 hrs  EIL 1x10 every 2-3 hrs EIS/EIL          EIL  20x 30x 30x         EIL L SG             EIL pt OP  10x 10x          EIL L SG pt OP             Sust Ext (L SG)    5 min 6'        EIS   20x 20x 30x        EIS Belt OP   10x 20x 30x        L SGIS             Ther Activity                                       Gait Training                                       Modalities

## 2024-02-28 DIAGNOSIS — E03.9 ACQUIRED HYPOTHYROIDISM: Primary | ICD-10-CM

## 2024-02-28 DIAGNOSIS — I10 ESSENTIAL HYPERTENSION: ICD-10-CM

## 2024-02-28 RX ORDER — CARVEDILOL 6.25 MG/1
6.25 TABLET ORAL 2 TIMES DAILY WITH MEALS
Qty: 180 TABLET | Refills: 0 | Status: SHIPPED | OUTPATIENT
Start: 2024-02-28

## 2024-02-28 RX ORDER — LEVOTHYROXINE SODIUM 0.05 MG/1
50 TABLET ORAL DAILY
Qty: 90 TABLET | Refills: 0 | Status: SHIPPED | OUTPATIENT
Start: 2024-02-28

## 2024-04-02 ENCOUNTER — OFFICE VISIT (OUTPATIENT)
Dept: INTERNAL MEDICINE CLINIC | Facility: CLINIC | Age: 44
End: 2024-04-02
Payer: COMMERCIAL

## 2024-04-02 ENCOUNTER — NURSE TRIAGE (OUTPATIENT)
Age: 44
End: 2024-04-02

## 2024-04-02 ENCOUNTER — NURSE TRIAGE (OUTPATIENT)
Dept: FAMILY MEDICINE CLINIC | Facility: CLINIC | Age: 44
End: 2024-04-02

## 2024-04-02 VITALS
OXYGEN SATURATION: 99 % | TEMPERATURE: 96.6 F | WEIGHT: 256.4 LBS | SYSTOLIC BLOOD PRESSURE: 124 MMHG | HEART RATE: 76 BPM | DIASTOLIC BLOOD PRESSURE: 80 MMHG | BODY MASS INDEX: 42.72 KG/M2 | HEIGHT: 65 IN

## 2024-04-02 DIAGNOSIS — N20.0 NEPHROLITHIASIS: ICD-10-CM

## 2024-04-02 DIAGNOSIS — N39.0 URINARY TRACT INFECTION WITHOUT HEMATURIA, SITE UNSPECIFIED: Primary | ICD-10-CM

## 2024-04-02 LAB
BILIRUB UR QL STRIP: NEGATIVE
CLARITY UR: CLEAR
COLOR UR: NORMAL
GLUCOSE UR STRIP-MCNC: NEGATIVE MG/DL
HGB UR QL STRIP.AUTO: NEGATIVE
KETONES UR STRIP-MCNC: NEGATIVE MG/DL
LEUKOCYTE ESTERASE UR QL STRIP: NEGATIVE
NITRITE UR QL STRIP: NEGATIVE
PH UR STRIP.AUTO: 6.5 [PH]
PROT UR STRIP-MCNC: NEGATIVE MG/DL
SP GR UR STRIP.AUTO: 1.01 (ref 1–1.03)
UROBILINOGEN UR STRIP-ACNC: <2 MG/DL

## 2024-04-02 PROCEDURE — 99213 OFFICE O/P EST LOW 20 MIN: CPT | Performed by: INTERNAL MEDICINE

## 2024-04-02 PROCEDURE — 81003 URINALYSIS AUTO W/O SCOPE: CPT | Performed by: INTERNAL MEDICINE

## 2024-04-02 RX ORDER — NITROFURANTOIN 25; 75 MG/1; MG/1
CAPSULE ORAL
COMMUNITY
Start: 2024-03-30

## 2024-04-02 NOTE — TELEPHONE ENCOUNTER
Please call patient.  Please ask which urgent care she went to over the weekend. Clarify what antibiotics she is taking.    Any fevers? Back or groin pain? Nausea/vomiting?    I can order a urine culture and wait for those results, may need to change antibiotics. Let me know.

## 2024-04-02 NOTE — PROGRESS NOTES
Name: Abby Jonas      : 1980      MRN: [id number]  Encounter Provider: Jenny King MD  Encounter Date: 2024   Encounter department: [facility] INTER[facility] MEDICINE    Assessment & Plan     1. Urinary tract infection without hematuria, site unspecified  Comments:  Check urine culture. Increase daily fluid intake.  Discussed possible interstitial cystitais if negative cx, reviewed diet.  Orders:  -     UA w Reflex to Microscopic w Reflex to Culture; Future  -     UA w Reflex to Microscopic w Reflex to Culture    2. Nephrolithiasis  Assessment & Plan:  Increase water intake.             Subjective      Symptoms started about a week ago. Symptoms initially mild but progressed the next few days.  She developed urgency, lower abdominal discomfort. Over the weekend, she did a virtual visit and was started on Macrobid.  She felt better the next day, but started not feeling well again since yesterday. She reports the urgency is still there, discomfort improved. However, she is having more frequent low back pain.    She has a history of kidney stones, no visible blood. She feels it is not the same kind of pain.  She is worried since her symptoms are not yet better, getting anxious. She is asking what it could be if the urine culture is normal.      Review of Systems   Constitutional:  Negative for chills and fever.   HENT:  Negative for congestion.    Respiratory:  Negative for cough.    Genitourinary:  Positive for dysuria, frequency and urgency. Negative for decreased urine volume, difficulty urinating, hematuria, vaginal discharge and vaginal pain.   Musculoskeletal:  Positive for back pain.       Current Outpatient Medications on File Prior to Visit   Medication Sig   • carvedilol (COREG) 6.25 mg tablet Take 1 tablet (6.25 mg total) by mouth 2 (two) times a day with meals   • cetirizine (ZyrTEC) 5 MG tablet Take 5 mg by mouth daily   • cholecalciferol (VITAMIN D3) 1,000 units tablet Take  "1,000 Units by mouth daily   • levothyroxine 50 mcg tablet Take 1 tablet (50 mcg total) by mouth daily   • nitrofurantoin (MACROBID) 100 mg capsule    • triamcinolone (KENALOG) 0.1 % cream Apply topically 2 (two) times a day Apply topically 2 times a day for three weeks, followed by once a day for a week, followed by every other day for one week. (Patient not taking: Reported on 2/8/2024)       Objective     /80   Pulse 76   Temp (!) 96.6 °F (35.9 °C)   Ht 5' 5\" (1.651 m)   Wt 116 kg (256 lb 6.4 oz)   SpO2 99%   BMI 42.67 kg/m²     Physical Exam  Constitutional:       Appearance: Normal appearance.   HENT:      Mouth/Throat:      Mouth: Mucous membranes are moist.   Pulmonary:      Effort: Pulmonary effort is normal. No respiratory distress.   Neurological:      General: No focal deficit present.      Mental Status: She is alert and oriented to person, place, and time.   Psychiatric:         Attention and Perception: Attention normal.         Mood and Affect: Mood is anxious.         Speech: Speech normal.         Behavior: Behavior normal.       Jenny King MD    "

## 2024-04-02 NOTE — TELEPHONE ENCOUNTER
Regarding: kidney stones  ----- Message from Alexandria Newman MA sent at 4/2/2024  8:43 AM EDT -----  Patient stated that she would like to speak to an RN regarding her stones and possible next steps.  Alexandria Newman

## 2024-04-02 NOTE — TELEPHONE ENCOUNTER
"Pt called in stating that she is on day four of taking antibiotics for UTI symptoms and says that symptoms have only slightly improved. Pt is still experiencing mild bladder discomfort and urinary urgency. Office appt made for pt today, 4/2.    Reason for Disposition   Taking antibiotic > 72 hours (3 days) for UTI and painful urination or frequency not improved    Answer Assessment - Initial Assessment Questions  1. ANTIBIOTIC: \"What antibiotic are you taking?\" \"How many times per day?\"      UTI symptoms on Thursday. Started antibiotics on Saturday.   2. DURATION: \"When was the antibiotic started?\"      3/29  3. MAIN SYMPTOM: \"What is the main symptom you are concerned about?\"      Urinary frequency   4. FEVER: \"Do you have a fever?\" If Yes, ask: \"What is it, how was it measured, and when did it start?\"      Denies  5. PAIN: \"How bad is the pain?\" (Scale 1 - 10; mild, moderate or severe)  -MILD (1-3): doesn't interfere with normal activities  -MODERATE (4-7): interferes with normal activities (e.g., work or school) or awakens from sleep  -SEVERE (8-10): excruciating pain and patient unable to do any normal activities      Mild  6. OTHER SYMPTOMS: \"Do you have any other symptoms?\" (e.g., flank pain, vaginal discharge, blood in urine)      Mild flank pain and lower abdomin.   REGNANCY: \"Is there any chance you are pregnant?\" \"When was your last menstrual period?\"      Denies    Protocols used: Urinary Tract Infection on Antibiotic Follow-up Call - Female-ADULT-[place]    "

## 2024-04-02 NOTE — TELEPHONE ENCOUNTER
Regarding: Kidney stones  ----- Message from Alexandria Newman MA sent at 4/2/2024  8:25 AM EDT -----  Possible stone would like to speak to an RN regarding this.  Alexandria Newman

## 2024-04-03 ENCOUNTER — TELEPHONE (OUTPATIENT)
Age: 44
End: 2024-04-03

## 2024-04-03 ENCOUNTER — HOSPITAL ENCOUNTER (EMERGENCY)
Facility: HOSPITAL | Age: 44
Discharge: HOME | End: 2024-04-04
Attending: EMERGENCY MEDICINE
Payer: COMMERCIAL

## 2024-04-03 ENCOUNTER — PATIENT MESSAGE (OUTPATIENT)
Dept: INTERNAL MEDICINE CLINIC | Facility: CLINIC | Age: 44
End: 2024-04-03

## 2024-04-03 DIAGNOSIS — R35.0 URINARY FREQUENCY: Primary | ICD-10-CM

## 2024-04-03 DIAGNOSIS — R10.9 ABDOMINAL PAIN: ICD-10-CM

## 2024-04-03 DIAGNOSIS — R39.15 URINARY URGENCY: Primary | ICD-10-CM

## 2024-04-03 PROCEDURE — 99284 EMERGENCY DEPT VISIT MOD MDM: CPT

## 2024-04-03 PROCEDURE — 99285 EMERGENCY DEPT VISIT HI MDM: CPT | Performed by: EMERGENCY MEDICINE

## 2024-04-03 NOTE — TELEPHONE ENCOUNTER
Referral to Urology sent.    Please start a low acid diet. You can look of IC (interstitial cystitis) diet online, or I can also mail you information.  Avoid coffee, dark beverages. Avoid acidic foods: tomatoes, oranges, pineapples etc.    Your urine is normal, no further antibiotics recommended.  Please finish remaining Macrobid.

## 2024-04-03 NOTE — TELEPHONE ENCOUNTER
Patient called and states symptoms are getting worse, feeling a lot of bladder pressure and urgency to urinate. Patient uses [facility] on [address] and can be reached at [phone]

## 2024-04-03 NOTE — TELEPHONE ENCOUNTER
Patient asking if there is anything she can do for the urgency. With pressure and the feeling of having to pee constantly. No pain  Alexandria Trent

## 2024-04-04 ENCOUNTER — APPOINTMENT (EMERGENCY)
Dept: CT IMAGING | Facility: HOSPITAL | Age: 44
End: 2024-04-04
Payer: COMMERCIAL

## 2024-04-04 VITALS
OXYGEN SATURATION: 100 % | TEMPERATURE: 97.7 F | HEART RATE: 68 BPM | RESPIRATION RATE: 18 BRPM | DIASTOLIC BLOOD PRESSURE: 74 MMHG | SYSTOLIC BLOOD PRESSURE: 157 MMHG

## 2024-04-04 LAB
ALBUMIN SERPL BCP-MCNC: 3.9 G/DL (ref 3.5–5)
ALP SERPL-CCNC: 77 U/L (ref 34–104)
ALT SERPL W P-5'-P-CCNC: 11 U/L (ref 7–52)
ANION GAP SERPL CALCULATED.3IONS-SCNC: 6 MMOL/L (ref 4–13)
AST SERPL W P-5'-P-CCNC: 11 U/L (ref 13–39)
BASOPHILS # BLD AUTO: 0.03 THOUSANDS/ÂΜL (ref 0–0.1)
BASOPHILS NFR BLD AUTO: 0 % (ref 0–1)
BILIRUB SERPL-MCNC: 0.73 MG/DL (ref 0.2–1)
BILIRUB UR QL STRIP: NEGATIVE
BUN SERPL-MCNC: 12 MG/DL (ref 5–25)
C TRACH DNA SPEC QL NAA+PROBE: NEGATIVE
CALCIUM SERPL-MCNC: 9.4 MG/DL (ref 8.4–10.2)
CHLORIDE SERPL-SCNC: 105 MMOL/L (ref 96–108)
CLARITY UR: CLEAR
CO2 SERPL-SCNC: 28 MMOL/L (ref 21–32)
COLOR UR: NORMAL
CREAT SERPL-MCNC: 0.76 MG/DL (ref 0.6–1.3)
EOSINOPHIL # BLD AUTO: 0.17 THOUSAND/ÂΜL (ref 0–0.61)
EOSINOPHIL NFR BLD AUTO: 2 % (ref 0–6)
ERYTHROCYTE [DISTWIDTH] IN BLOOD BY AUTOMATED COUNT: 12.8 % (ref 11.6–15.1)
EXT PREGNANCY TEST URINE: NEGATIVE
EXT. CONTROL: NORMAL
GFR SERPL CREATININE-BSD FRML MDRD: 96 ML/MIN/1.73SQ M
GLUCOSE SERPL-MCNC: 87 MG/DL (ref 65–140)
GLUCOSE UR STRIP-MCNC: NEGATIVE MG/DL
HCT VFR BLD AUTO: 44.5 % (ref 34.8–46.1)
HGB BLD-MCNC: 14.4 G/DL (ref 11.5–15.4)
HGB UR QL STRIP.AUTO: NEGATIVE
IMM GRANULOCYTES # BLD AUTO: 0.02 THOUSAND/UL (ref 0–0.2)
IMM GRANULOCYTES NFR BLD AUTO: 0 % (ref 0–2)
KETONES UR STRIP-MCNC: NEGATIVE MG/DL
LEUKOCYTE ESTERASE UR QL STRIP: NEGATIVE
LIPASE SERPL-CCNC: 49 U/L (ref 11–82)
LYMPHOCYTES # BLD AUTO: 2.53 THOUSANDS/ÂΜL (ref 0.6–4.47)
LYMPHOCYTES NFR BLD AUTO: 25 % (ref 14–44)
MCH RBC QN AUTO: 30 PG (ref 26.8–34.3)
MCHC RBC AUTO-ENTMCNC: 32.4 G/DL (ref 31.4–37.4)
MCV RBC AUTO: 93 FL (ref 82–98)
MONOCYTES # BLD AUTO: 0.97 THOUSAND/ÂΜL (ref 0.17–1.22)
MONOCYTES NFR BLD AUTO: 10 % (ref 4–12)
N GONORRHOEA DNA SPEC QL NAA+PROBE: NEGATIVE
NEUTROPHILS # BLD AUTO: 6.43 THOUSANDS/ÂΜL (ref 1.85–7.62)
NEUTS SEG NFR BLD AUTO: 63 % (ref 43–75)
NITRITE UR QL STRIP: NEGATIVE
NRBC BLD AUTO-RTO: 0 /100 WBCS
PH UR STRIP.AUTO: 5.5 [PH]
PLATELET # BLD AUTO: 319 THOUSANDS/UL (ref 149–390)
PMV BLD AUTO: 10.8 FL (ref 8.9–12.7)
POTASSIUM SERPL-SCNC: 4.1 MMOL/L (ref 3.5–5.3)
PROT SERPL-MCNC: 7.4 G/DL (ref 6.4–8.4)
PROT UR STRIP-MCNC: NEGATIVE MG/DL
RBC # BLD AUTO: 4.8 MILLION/UL (ref 3.81–5.12)
SODIUM SERPL-SCNC: 139 MMOL/L (ref 135–147)
SP GR UR STRIP.AUTO: 1.02 (ref 1–1.03)
UROBILINOGEN UR STRIP-ACNC: <2 MG/DL
WBC # BLD AUTO: 10.15 THOUSAND/UL (ref 4.31–10.16)

## 2024-04-04 PROCEDURE — 36415 COLL VENOUS BLD VENIPUNCTURE: CPT

## 2024-04-04 PROCEDURE — 87491 CHLMYD TRACH DNA AMP PROBE: CPT | Performed by: EMERGENCY MEDICINE

## 2024-04-04 PROCEDURE — 85025 COMPLETE CBC W/AUTO DIFF WBC: CPT

## 2024-04-04 PROCEDURE — 96361 HYDRATE IV INFUSION ADD-ON: CPT

## 2024-04-04 PROCEDURE — 83690 ASSAY OF LIPASE: CPT

## 2024-04-04 PROCEDURE — 96360 HYDRATION IV INFUSION INIT: CPT

## 2024-04-04 PROCEDURE — 74177 CT ABD & PELVIS W/CONTRAST: CPT

## 2024-04-04 PROCEDURE — 87591 N.GONORRHOEAE DNA AMP PROB: CPT | Performed by: EMERGENCY MEDICINE

## 2024-04-04 PROCEDURE — 81025 URINE PREGNANCY TEST: CPT

## 2024-04-04 PROCEDURE — 81003 URINALYSIS AUTO W/O SCOPE: CPT

## 2024-04-04 PROCEDURE — 80053 COMPREHEN METABOLIC PANEL: CPT

## 2024-04-04 RX ORDER — KETOROLAC TROMETHAMINE 30 MG/ML
15 INJECTION, SOLUTION INTRAMUSCULAR; INTRAVENOUS ONCE
Status: DISCONTINUED | OUTPATIENT
Start: 2024-04-04 | End: 2024-04-04 | Stop reason: HOSPADM

## 2024-04-04 RX ORDER — ONDANSETRON 2 MG/ML
4 INJECTION INTRAMUSCULAR; INTRAVENOUS ONCE
Status: DISCONTINUED | OUTPATIENT
Start: 2024-04-04 | End: 2024-04-04 | Stop reason: HOSPADM

## 2024-04-04 RX ADMIN — SODIUM CHLORIDE 1000 ML: 0.9 INJECTION, SOLUTION INTRAVENOUS at 00:24

## 2024-04-04 RX ADMIN — IOHEXOL 85 ML: 350 INJECTION, SOLUTION INTRAVENOUS at 01:01

## 2024-04-04 NOTE — ED PROVIDER NOTES
"History  Chief Complaint   Patient presents with    Possible UTI     Pt reports urinary issues, lower abd pain for over a week. Reports she saw PCP, was given abx for 5 days, reports continued urinary symptoms that are disrupting sleep (frequency, urgency). Denies hematuria, back pain.      HPI Pt is a 42 y/o female presenting to the ED with urgency and frequency with discomfort in LLQ/left adnexa beginning about a week ago, started macrobid by pcp completed 5 day course today. No fevers, chills, vomiting, diarrhea, constipation, pruritus, rash, vaginal discharge. Recent UA negative. Hx of kidney stones \"years ago\", states this does not feel similar. No previous surgery.     Prior to Admission Medications   Prescriptions Last Dose Informant Patient Reported? Taking?   carvedilol (COREG) 6.25 mg tablet   No No   Sig: Take 1 tablet (6.25 mg total) by mouth 2 (two) times a day with meals   cetirizine (ZyrTEC) 5 MG tablet  Self Yes No   Sig: Take 5 mg by mouth daily   cholecalciferol (VITAMIN D3) 1,000 units tablet  Self Yes No   Sig: Take 1,000 Units by mouth daily   levothyroxine 50 mcg tablet   No No   Sig: Take 1 tablet (50 mcg total) by mouth daily   nitrofurantoin (MACROBID) 100 mg capsule   Yes No   triamcinolone (KENALOG) 0.1 % cream   No No   Sig: Apply topically 2 (two) times a day Apply topically 2 times a day for three weeks, followed by once a day for a week, followed by every other day for one week.   Patient not taking: Reported on 2/8/2024      Facility-Administered Medications: None       History reviewed. No pertinent past medical history.    History reviewed. No pertinent surgical history.    Family History   Problem Relation Age of Onset    Heart disease Father 39        heart attack    Diabetes Father     Alcohol abuse Neg Hx     Substance Abuse Neg Hx     Mental illness Neg Hx     Depression Neg Hx      I have reviewed and agree with the history as documented.    E-Cigarette/Vaping    E-Cigarette " Use Never User      E-Cigarette/Vaping Substances    Nicotine No     THC No     CBD No     Flavoring No     Other No     Unknown No      Social History     Tobacco Use    Smoking status: Never    Smokeless tobacco: Never   Vaping Use    Vaping status: Never Used   Substance Use Topics    Alcohol use: Not Currently     Comment: socially    Drug use: Never        Review of Systems   Genitourinary:  Positive for dysuria and urgency.   All other systems reviewed and are negative.      Physical Exam  ED Triage Vitals [04/04/24 0001]   Temperature Pulse Respirations Blood Pressure SpO2   97.7 °F (36.5 °C) 68 18 (!) 180/98 100 %      Temp Source Heart Rate Source Patient Position - Orthostatic VS BP Location FiO2 (%)   Oral Monitor Sitting -- --      Pain Score       --             Orthostatic Vital Signs  Vitals:    04/04/24 0001 04/04/24 0005 04/04/24 0157   BP: (!) 180/98 (!) 172/70 157/74   Pulse: 68     Patient Position - Orthostatic VS: Sitting         Physical Exam  Vitals and nursing note reviewed.   Constitutional:       General: She is in acute distress (anxious).      Appearance: She is not ill-appearing, toxic-appearing or diaphoretic.   HENT:      Head: Normocephalic and atraumatic.      Nose: Nose normal.      Mouth/Throat:      Mouth: Mucous membranes are moist.      Pharynx: Oropharynx is clear.   Eyes:      Extraocular Movements: Extraocular movements intact.      Conjunctiva/sclera: Conjunctivae normal.      Pupils: Pupils are equal, round, and reactive to light.   Cardiovascular:      Rate and Rhythm: Normal rate and regular rhythm.      Pulses: Normal pulses.      Heart sounds: Normal heart sounds.   Pulmonary:      Effort: Pulmonary effort is normal. No respiratory distress.      Breath sounds: Normal breath sounds. No stridor. No wheezing, rhonchi or rales.   Chest:      Chest wall: No tenderness.   Abdominal:      General: Bowel sounds are normal. There is no distension.      Palpations: Abdomen is  soft. There is no mass.      Tenderness: There is abdominal tenderness (llq, left adnexal mild). There is no right CVA tenderness, left CVA tenderness, guarding or rebound.      Hernia: No hernia is present.   Musculoskeletal:         General: No swelling, tenderness, deformity or signs of injury. Normal range of motion.      Cervical back: Normal range of motion and neck supple. No rigidity or tenderness.      Right lower leg: No edema.      Left lower leg: No edema.   Skin:     General: Skin is warm and dry.      Coloration: Skin is not jaundiced or pale.      Findings: No bruising, erythema, lesion or rash.   Neurological:      General: No focal deficit present.      Mental Status: She is alert and oriented to person, place, and time.      Cranial Nerves: No cranial nerve deficit.      Sensory: No sensory deficit.      Motor: No weakness.      Coordination: Coordination normal.   Psychiatric:         Behavior: Behavior normal.      Comments: anxious         ED Medications  Medications   sodium chloride 0.9 % bolus 1,000 mL (0 mL Intravenous Stopped 4/4/24 0201)   iohexol (OMNIPAQUE) 350 MG/ML injection (MULTI-DOSE) 100 mL (85 mL Intravenous Given 4/4/24 0101)       Diagnostic Studies  Results Reviewed       Procedure Component Value Units Date/Time    UA w Reflex to Microscopic w Reflex to Culture [[id number]] Collected: 04/04/24 0024    Lab Status: Final result Specimen: Urine, Clean Catch Updated: 04/04/24 0052     Color, UA Light Yellow     Clarity, UA Clear     Specific Gravity, UA 1.023     pH, UA 5.5     Leukocytes, UA Negative     Nitrite, UA Negative     Protein, UA Negative mg/dl      Glucose, UA Negative mg/dl      Ketones, UA Negative mg/dl      Urobilinogen, UA <2.0 mg/dl      Bilirubin, UA Negative     Occult Blood, UA Negative    Comprehensive metabolic panel [[id number]]  (Abnormal) Collected: 04/04/24 0018    Lab Status: Final result Specimen: Blood from Arm, Right Updated: 04/04/24 0044     Sodium  139 mmol/L      Potassium 4.1 mmol/L      Chloride 105 mmol/L      CO2 28 mmol/L      ANION GAP 6 mmol/L      BUN 12 mg/dL      Creatinine 0.76 mg/dL      Glucose 87 mg/dL      Calcium 9.4 mg/dL      AST 11 U/L      ALT 11 U/L      Alkaline Phosphatase 77 U/L      Total Protein 7.4 g/dL      Albumin 3.9 g/dL      Total Bilirubin 0.73 mg/dL      eGFR 96 ml/min/1.73sq m     Narrative:      National Kidney Disease Foundation guidelines for Chronic Kidney Disease (CKD):     Stage 1 with normal or high GFR (GFR > 90 mL/min/1.73 square meters)    Stage 2 Mild CKD (GFR = 60-89 mL/min/1.73 square meters)    Stage 3A Moderate CKD (GFR = 45-59 mL/min/1.73 square meters)    Stage 3B Moderate CKD (GFR = 30-44 mL/min/1.73 square meters)    Stage 4 Severe CKD (GFR = 15-29 mL/min/1.73 square meters)    Stage 5 End Stage CKD (GFR <15 mL/min/1.73 square meters)  Note: GFR calculation is accurate only with a steady state creatinine    Lipase [[id number]]  (Normal) Collected: 04/04/24 0018    Lab Status: Final result Specimen: Blood from Arm, Right Updated: 04/04/24 0044     Lipase 49 u/L     Chlamydia/GC amplified DNA by PCR [[id number]] Collected: 04/04/24 0025    Lab Status: In process Specimen: Urine, Other Updated: 04/04/24 0032    POCT pregnancy, urine [[id number]]  (Normal) Resulted: 04/04/24 0027    Lab Status: Final result Updated: 04/04/24 0028     EXT Preg Test, Ur Negative     Control Valid    CBC and differential [[id number]] Collected: 04/04/24 0018    Lab Status: Final result Specimen: Blood from Arm, Right Updated: 04/04/24 0026     WBC 10.15 Thousand/uL      RBC 4.80 Million/uL      Hemoglobin 14.4 g/dL      Hematocrit 44.5 %      MCV 93 fL      MCH 30.0 pg      MCHC 32.4 g/dL      RDW 12.8 %      MPV 10.8 fL      Platelets 319 Thousands/uL      nRBC 0 /100 WBCs      Neutrophils Relative 63 %      Immature Grans % 0 %      Lymphocytes Relative 25 %      Monocytes Relative 10 %      Eosinophils Relative 2 %      Basophils  Relative 0 %      Neutrophils Absolute 6.43 Thousands/µL      Absolute Immature Grans 0.02 Thousand/uL      Absolute Lymphocytes 2.53 Thousands/µL      Absolute Monocytes 0.97 Thousand/µL      Eosinophils Absolute 0.17 Thousand/µL      Basophils Absolute 0.03 Thousands/µL                    CT abdomen pelvis with contrast   Final Result by Eran Renee MD (04/04 0133)      No acute intra-abdominal abnormality. No free air or free fluid.         Workstation performed: [id number]               Procedures  Procedures      ED Course     Pt declined pain and nausea medication.    Counseled pt regarding case. CT abd/pelv with contrast negative for acute process. UA, CBC, CMP, lipase, urine preg wnl. Hemodynamically stable, no acute distress. She is somewhat reassured by results wnl. She is agreeable to return precautions, will otherwise f/u with her pcp this week.                      SBIRT 20yo+      Flowsheet Row Most Recent Value   Initial Alcohol Screen: US AUDIT-C     1. How often do you have a drink containing alcohol? 0 Filed at: 04/04/2024 0004   2. How many drinks containing alcohol do you have on a typical day you are drinking?  0 Filed at: 04/04/2024 0004   3b. FEMALE Any Age, or MALE 65+: How often do you have 4 or more drinks on one occassion? 0 Filed at: 04/04/2024 0004   Audit-C Score 0 Filed at: 04/04/2024 0004   STUART: How many times in the past year have you...    Used an illegal drug or used a prescription medication for non-medical reasons? Never Filed at: 04/04/2024 0004                  Medical Decision Making  Differential diagnosis including but not limited to: UTI, urethritis, appendicitis, gastroenteritis, gastritis, PUD, GERD, gastroparesis, hepatitis, pancreatitis, colitis, enteritis, diverticulitis, food poisoning, mesenteric adenitis, epiploic appendagitis, mesenteric panniculitis, mesenteric ischemia, IBD, IBS, ileus, bowel obstruction, volvulus, internal hernia, AAA, cholecystitis,  "biliary colic, choledocholithiasis, perforated viscus, tumor, splenic etiology, constipation, pelvic pathology, renal colic, pyelonephritis, UTI; doubt cardiac etiology.     44 y/o female presenting to the ED with urgency and frequency with discomfort in LLQ/left adnexa beginning about a week ago, started macrobid by pcp completed 5 day course today. No fevers, chills, vomiting, diarrhea, constipation, pruritus, rash, vaginal discharge. Recent UA negative. Hx of kidney stones \"years ago\", states this does not feel similar. No previous surgery.     Counseled pt regarding case. CT abd/pelv with contrast negative for acute process. UA, CBC, CMP, lipase, urine preg wnl. Hemodynamically stable, no acute distress. She is somewhat reassured by results wnl. She is agreeable to return precautions, will otherwise f/u with her pcp this week.      Amount and/or Complexity of Data Reviewed  Labs: ordered.  Radiology: ordered.    Risk  Prescription drug management.          Disposition  Final diagnoses:   Urinary frequency   Abdominal pain - LLQ, left adnexa     Time reflects when diagnosis was documented in both MDM as applicable and the Disposition within this note       Time User Action Codes Description Comment    4/4/2024  1:42 AM Shay Melvin Add [R35.0] Urinary frequency     4/4/2024  1:43 AM Shay Melvin Add [R10.9] Abdominal pain     4/4/2024  1:43 AM Shay Melvin Modify [R10.9] Abdominal pain LLQ, left adnexa          ED Disposition       ED Disposition   Discharge    Condition   Stable    Date/Time   Thu Apr 4, 2024 0142    Comment   Abby Jonas discharge to home/self care.                   Follow-up Information       Follow up With Specialties Details Why Contact Info Additional Information    Jenny King MD Internal Medicine In 3 days  [address] [facility]  [address] [place] [id number] [phone]       [facility] Emergency Department Emergency Medicine  As " needed [address] [facility] [address]  [phone] [facility] Emergency Department, [address] [facility], [place], [place], [address]            Discharge Medication List as of 4/4/2024  1:45 AM        CONTINUE these medications which have NOT CHANGED    Details   carvedilol (COREG) 6.25 mg tablet Take 1 tablet (6.25 mg total) by mouth 2 (two) times a day with meals, Starting Wed 2/28/2024, Normal      cetirizine (ZyrTEC) 5 MG tablet Take 5 mg by mouth daily, Historical Med      cholecalciferol (VITAMIN D3) 1,000 units tablet Take 1,000 Units by mouth daily, Historical Med      levothyroxine 50 mcg tablet Take 1 tablet (50 mcg total) by mouth daily, Starting Wed 2/28/2024, Normal      nitrofurantoin (MACROBID) 100 mg capsule Historical Med      triamcinolone (KENALOG) 0.1 % cream Apply topically 2 (two) times a day Apply topically 2 times a day for three weeks, followed by once a day for a week, followed by every other day for one week., Starting Wed 9/6/2023, Normal           No discharge procedures on file.    PDMP Review         Value Time User    PDMP Reviewed  Yes 4/3/2024 11:57 PM Kenan Arevalo MD             ED Provider  Attending physically available and evaluated Abby Jonas. I managed the patient along with the ED Attending.    Electronically Signed by           Shay Melvin,   04/04/24 0714

## 2024-04-04 NOTE — DISCHARGE INSTRUCTIONS
Call your pcp for reevaluation in the next week. Please return to the ED as needed for new/worsening symptoms. May try tylenol/ibuprofen for discomfort.

## 2024-04-04 NOTE — ED ATTENDING ATTESTATION
4/3/2024  I, Kenan Arevalo MD, saw and evaluated the patient. I have discussed the patient with the resident/non-physician practitioner and agree with the resident's/non-physician practitioner's findings, Plan of Care, and MDM as documented in the resident's/non-physician practitioner's note, except where noted. All available labs and Radiology studies were reviewed.  I was present for key portions of any procedure(s) performed by the resident/non-physician practitioner and I was immediately available to provide assistance.       At this point I agree with the current assessment done in the Emergency Department.  I have conducted an independent evaluation of this patient a history and physical is as follows: Patient is a 43 year old female with urinary urgency for past 10 days. Was placed on abx after televisit. Was last seen at [facility] on 4/2/24 for UTI. Had negative UA. Had dysuria. Is still having symptoms and has had LLQ abdominal pain and pressure. No N/V/D. No fever. No abdominal surgery. LMP - 2 weeks ago. PMPAWARERX website checked on this patient and no Rx found. NCAT. Moist mucous membranes. Oropharynx clear. No scleral icterus. Lungs clear. Heart regular without murmur. Abdomen soft with mild LLQ tenderness. Good bowel sounds. No acute abdomen on exam. No rash noted. No jaundice. DDx including but not limited to: appendicitis, gastroenteritis, gastritis, PUD, GERD, gastroparesis, hepatitis, pancreatitis, colitis, enteritis, food poisoning, mesenteric adenitis, epiploic appendagitis, IBD, IBS, ileus, bowel obstruction, volvulus, cholecystitis, biliary colic, choledocholithiasis, perforated viscus, tumor, splenic etiology, diverticulitis, internal hernia, constipation, pelvic pathology, renal colic, pyelonephritis, UTI.  DDx including but not limited to: interstitial cystitis, urethritis, vaginitis, metabolic abnormality. Will check labs and CT.     ED Course         Critical Care Time  Procedures

## 2024-04-08 ENCOUNTER — TELEPHONE (OUTPATIENT)
Dept: UROLOGY | Facility: CLINIC | Age: 44
End: 2024-04-08

## 2024-04-24 ENCOUNTER — OFFICE VISIT (OUTPATIENT)
Dept: UROLOGY | Facility: CLINIC | Age: 44
End: 2024-04-24
Payer: COMMERCIAL

## 2024-04-24 VITALS
OXYGEN SATURATION: 100 % | BODY MASS INDEX: 42.59 KG/M2 | DIASTOLIC BLOOD PRESSURE: 78 MMHG | SYSTOLIC BLOOD PRESSURE: 122 MMHG | HEART RATE: 64 BPM | HEIGHT: 65 IN | WEIGHT: 255.6 LBS

## 2024-04-24 DIAGNOSIS — R39.15 URINARY URGENCY: Primary | ICD-10-CM

## 2024-04-24 PROCEDURE — 99204 OFFICE O/P NEW MOD 45 MIN: CPT | Performed by: UROLOGY

## 2024-04-24 NOTE — PROGRESS NOTES
"Referring Physician: Jenny King MD  A copy of this note was sent to the referring physician.       Diagnoses and all orders for this visit:    Urinary urgency  -     Ambulatory Referral to Urology  -     Mirabegron ER 25 MG TB24; Take 25 mg by mouth daily at bedtime    Other orders  -     FA-Cyanocobalamin-B6-D-Ca-Aloe (RX SUPPORT HB/REFLUX/ALOE PO); Take by mouth            Assessment and plan:       1.  Urinary urgency    2.  5 mm right lower pole renal calculus present on CT    3.  Medical comorbidities:  -Reflux disease  - Class III obesity        The patient has clinical signs and symptoms of over active bladder, or OAB. We discussed the prevalence of these symptoms in the [place]. We discussed stepwise approach in treating urinary urgency and frequency.  Lower urinary tract symptoms (LUTS) can be sub-divided into those that result from failure of the bladder to store urine normally (\"storage symptoms\"), those that result from difficulties in emptying (\"voiding symptoms\"), or those that follow micturition (\"post-micturition symptoms\"). OAB is due to the inability of the bladder to store urine normally. The symptoms of frequency, urgency, nocturia, and urge incontinence are classified as storage symptoms.  I discussed the stepwise approach to treatment including behavioral strategies such as pelvic floor physical therapy, constipation management, and managing fluid intake. The next step would include anticholinergic medications and Beta-3 agonists. If these therapies fail, additional treatments include, Botox injections, tibial nerve stimulation, or sacral neuromodulation.    Myrbetriq prescribed.  Also reviewed conservative measures.  Dietary and fluid intake as well.  Follow-up 3 months for symptom reassessment.    Ruddy Ag MD      Chief Complaint     Consult for overactive bladder, nephrolithiasis      History of Present Illness     Abby Jonas is a 43 y.o. furred in " consultation with lower urinary tract symptoms and nephrolithiasis    Detailed Urologic History     - please refer to HPI    Review of Systems     Review of Systems   Constitutional: Negative for activity change and fatigue.   HENT: Negative for congestion.    Eyes: Negative for visual disturbance.   Respiratory: Negative for shortness of breath and wheezing.    Cardiovascular: Negative for chest pain and leg swelling.   Gastrointestinal: Negative for abdominal pain.   Endocrine: Negative for polyuria.   Genitourinary: Negative for dysuria, flank pain, hematuria and urgency.   Musculoskeletal: Negative for back pain.   Allergic/Immunologic: Negative for immunocompromised state.   Neurological: Negative for dizziness and numbness.   Psychiatric/Behavioral: Negative for dysphoric mood.   All other systems reviewed and are negative.          Allergies     Allergies   Allergen Reactions    Clindamycin Hives    Penicillins Hives     Category: Allergy;       Phenazopyridine Hives     Category: Allergy;        Physical Exam       Physical Exam  Constitutional:       General: He is not in acute distress.     Appearance: He is well-developed.   HENT:      Head: Normocephalic and atraumatic.   Cardiovascular:      Comments: Negative lower extremity edema  Pulmonary:      Effort: Pulmonary effort is normal.      Breath sounds: Normal breath sounds.   Abdominal:      Palpations: Abdomen is soft.   Musculoskeletal:         General: Normal range of motion.      Cervical back: Normal range of motion.   Skin:     General: Skin is warm.   Neurological:      Mental Status: He is alert and oriented to person, place, and time.   Psychiatric:         Behavior: Behavior normal.           Vital Signs  There were no vitals filed for this visit.      Current Medications       Current Outpatient Medications:     carvedilol (COREG) 6.25 mg tablet, Take 1 tablet (6.25 mg total) by mouth 2 (two) times a day with meals, Disp: 180 tablet, Rfl:  "0    cetirizine (ZyrTEC) 5 MG tablet, Take 5 mg by mouth daily, Disp: , Rfl:     cholecalciferol (VITAMIN D3) 1,000 units tablet, Take 1,000 Units by mouth daily, Disp: , Rfl:     levothyroxine 50 mcg tablet, Take 1 tablet (50 mcg total) by mouth daily, Disp: 90 tablet, Rfl: 0    nitrofurantoin (MACROBID) 100 mg capsule, , Disp: , Rfl:     triamcinolone (KENALOG) 0.1 % cream, Apply topically 2 (two) times a day Apply topically 2 times a day for three weeks, followed by once a day for a week, followed by every other day for one week. (Patient not taking: Reported on 2/8/2024), Disp: 80 g, Rfl: 1      Active Problems     Patient Active Problem List   Diagnosis    Class 3 severe obesity due to excess calories without serious comorbidity with body mass index (BMI) of 40.0 to 44.9 in adult (HCC)    Essential hypertension    Acquired hypothyroidism    Vitamin D deficiency    GERD (gastroesophageal reflux disease)    PVC (premature ventricular contraction)    Nephrolithiasis         Past Medical History     No past medical history on file.      Surgical History     No past surgical history on file.      Family History     Family History   Problem Relation Age of Onset    Heart disease Father 39        heart attack    Diabetes Father     Alcohol abuse Neg Hx     Substance Abuse Neg Hx     Mental illness Neg Hx     Depression Neg Hx          Social History     Social History     Social History     Tobacco Use   Smoking Status Never   Smokeless Tobacco Never         Pertinent Lab Values     Lab Results   Component Value Date    CREATININE 0.76 04/04/2024       No results found for: \"PSA\"    @RESULTRCNT(1H])@      Pertinent Imaging       CT abdomen pelvis with contrast    Result Date: 4/4/2024  Narrative: CT ABDOMEN AND PELVIS WITH IV CONTRAST INDICATION: LLQ/left adnexal pain \"pressure on bladder\". COMPARISON: CT abdomen pelvis dated March 17, 2012. TECHNIQUE: CT examination of the abdomen and pelvis was performed. " "Multiplanar 2D reformatted images were created from the source data. This examination, like all CT scans performed in the [facility], was performed utilizing techniques to minimize radiation dose exposure, including the use of iterative reconstruction and automated exposure control. Radiation dose length product (DLP) for this visit: 821.43 mGy-cm IV Contrast: 85 mL of iohexol (OMNIPAQUE) Enteric Contrast: Not administered. FINDINGS: The study was mildly degraded by the patient's body habitus. ABDOMEN LOWER CHEST: No clinically significant abnormality in the visualized lower chest. LIVER/BILIARY TREE: Hepatic steatosis. No suspicious mass. Normal hepatic contours. No biliary dilation. GALLBLADDER: No calcified gallstones. No pericholecystic inflammatory change. SPLEEN: Unremarkable. PANCREAS: Unremarkable. ADRENAL GLANDS: Unremarkable. KIDNEYS/URETERS: No hydronephrosis. There is a 5 mm nonobstructing calculus at the right renal lower pole. STOMACH AND BOWEL: Unremarkable. APPENDIX: No findings to suggest appendicitis. ABDOMINOPELVIC CAVITY: No ascites. No pneumoperitoneum. No lymphadenopathy. VESSELS: Unremarkable for patient's age. PELVIS REPRODUCTIVE ORGANS: Unremarkable for patient's age. URINARY BLADDER: Unremarkable. ABDOMINAL WALL/INGUINAL REGIONS: Unremarkable. BONES: No acute fracture or suspicious osseous lesion.     Impression: No acute intra-abdominal abnormality. No free air or free fluid. Workstation performed: [id number]         Portions of the record may have been created with voice recognition software.  Occasional wrong word or \"sound a like\" substitutions may have occurred due to the inherent limitations of voice recognition software.  In addition some of the content generated from this outpatient encounter includes information designed for patient education and/or communication back to the referring provider.  Read the chart carefully and recognize, using context, where " substitutions have occurred.

## 2024-04-24 NOTE — PATIENT INSTRUCTIONS
How to Increase your Healthy Fluid Intake - A Urologist's Advice    Ruddy Ag MD,PhD  [facility]  [phone]    Increasing your daily dietary fluid intake can be a simple solution to many urologic problems.  You have been advised by your Urology team to increase her fluid intake.  Here are some helpful pieces of advice and guidance    How much should I drink each day?  A general goal for most healthy individuals is to consume 64 ounces daily.  This equates to 2 Liters, or 8 glasses daily.  Highly active and performance-oriented individuals may need more water.  Those who experience certain type of incontinence, or with heart or kidney insufficiency, should consume less.    What should I drink?  WATER is the absolute best fluid available.  In a sense, it is the only healthy you can drink.    How can I tell if I am drinking enough water to keep my body healthy?  Your urine should be clear -- every time your urinate!  When your pee is yellow, you need more.  Please note that multivitamins and some prescription medication can discolor the urine.    What to avoid:  - Sugar-containing drinks.  This includes gatorades, and even juice!    - Artificial Sweeteners - is often marketed as a more healthy alternative to sugar containing drinks.  However, these chemicals are not at all healthy.  Many contain chemicals that can exacerbate overactive bladder, and impair kidney and natural hormonal function.  - Sodas.  These hurt your urologic health in many ways.  The acids and sugars/artificial sweeteners make their way into your urine, and can cause bladder overactivity.  They cause your kidneys to temporarily make MORE URINE (more trips to the bathroom).  After consumption, they then result in dehydration - making your urine more concentrated and painful to pass.  - Energy drinks - these are the worst!  We have seen kidney stones, horribly painful urinary conditions, and kidney and heart  conditions caused entirely by these.  Aside from these extreme conditions, the cycle of energy spikes and crashes is truly awful.  Stay away!    What about caffeine?  Try to stick to 1 cup of coffee in the morning if able.  Healthy diet and exercise will provide better and longer lasting energy in the afternoons, and avoid the cycle of energy spikes and crashes (and the harmful chemicals) found in energy drinks.  Be sure to drink some water after your morning coffee to prevent dehydration    Tips or Hacks to help with fluid intake:  - Purchase a refillable water bottle.  Carry this throughout your day.  This helps tracks your daily consumption, and minimize waste  - if you tend to dislike the taste or sensation of drinking plain water, try using a bottle with a straw.  This can help make increasing your fluid intake way easier!  - Put a slice of fresh fruit in your water bottle.  Anything that is in the fridge - lemon, lime, melons, apples, orange slice (or peel!).  Get some delicious fresh taste without any artificial chemicals or processed carbohydrates!

## 2024-04-26 DIAGNOSIS — E03.9 ACQUIRED HYPOTHYROIDISM: ICD-10-CM

## 2024-04-26 DIAGNOSIS — I10 ESSENTIAL HYPERTENSION: ICD-10-CM

## 2024-04-27 RX ORDER — CARVEDILOL 6.25 MG/1
6.25 TABLET ORAL 2 TIMES DAILY WITH MEALS
Qty: 180 TABLET | Refills: 1 | Status: SHIPPED | OUTPATIENT
Start: 2024-04-27

## 2024-04-27 RX ORDER — LEVOTHYROXINE SODIUM 0.05 MG/1
50 TABLET ORAL DAILY
Qty: 90 TABLET | Refills: 1 | Status: SHIPPED | OUTPATIENT
Start: 2024-04-27

## 2024-05-31 ENCOUNTER — OFFICE VISIT (OUTPATIENT)
Dept: PODIATRY | Facility: CLINIC | Age: 44
End: 2024-05-31
Payer: COMMERCIAL

## 2024-05-31 VITALS
BODY MASS INDEX: 42.49 KG/M2 | SYSTOLIC BLOOD PRESSURE: 150 MMHG | HEART RATE: 73 BPM | HEIGHT: 65 IN | WEIGHT: 255 LBS | DIASTOLIC BLOOD PRESSURE: 85 MMHG

## 2024-05-31 DIAGNOSIS — M79.671 PAIN IN RIGHT FOOT: ICD-10-CM

## 2024-05-31 DIAGNOSIS — M65.871 OTHER SYNOVITIS AND TENOSYNOVITIS, RIGHT ANKLE AND FOOT: Primary | ICD-10-CM

## 2024-05-31 PROCEDURE — 20550 NJX 1 TENDON SHEATH/LIGAMENT: CPT | Performed by: PODIATRIST

## 2024-05-31 PROCEDURE — 99203 OFFICE O/P NEW LOW 30 MIN: CPT | Performed by: PODIATRIST

## 2024-05-31 RX ORDER — TRIAMCINOLONE ACETONIDE 40 MG/ML
20 INJECTION, SUSPENSION INTRA-ARTICULAR; INTRAMUSCULAR
Status: SHIPPED | OUTPATIENT
Start: 2024-05-31

## 2024-05-31 RX ORDER — LIDOCAINE HYDROCHLORIDE 10 MG/ML
2 INJECTION, SOLUTION INFILTRATION; PERINEURAL
Status: SHIPPED | OUTPATIENT
Start: 2024-05-31

## 2024-05-31 RX ADMIN — TRIAMCINOLONE ACETONIDE 20 MG: 40 INJECTION, SUSPENSION INTRA-ARTICULAR; INTRAMUSCULAR at 09:00

## 2024-05-31 RX ADMIN — LIDOCAINE HYDROCHLORIDE 2 ML: 10 INJECTION, SOLUTION INFILTRATION; PERINEURAL at 09:00

## 2024-05-31 NOTE — PROGRESS NOTES
PATIENT:  Abby Jonas  1980       ASSESSMENT:     1. Other synovitis and tenosynovitis, right ankle and foot  Foot/lower extremity injection      2. Pain in right foot  XR foot 3+ vw right                PLAN:  1. Reviewed medical records.  Patient was counseled and educated on the condition and the diagnosis.    2. X-ray was obtained and personally reviewed.  The radiological findings were discussed with the patient.  There is no fracture.  3. The exam and symptoms are consistent with EDL tendinitis.  The diagnosis, treatment options and prognosis were discussed with the patient.    4. Treatment options were discussed and the patient wished to proceed with an injection.  See procedure.  5. Instructed supportive care, home exercise, icing, and proper footwear/ arch support.    6. Patient will return in 6 weeks for re-evaluation.       Imaging: I have personally reviewed pertinent films in PACS  Labs, pathology, and Other Studies: I have personally reviewed pertinent reports.      Foot/lower extremity injection    Performed by: Venkat Mckeon DPM  Authorized by: Venkat Mckeon DPM    Procedure:     Verbal consent obtained?: Yes      Risks and benefits: Risks, benefits and alternatives were discussed      Consent given by:  Patient    Time out: Immediately prior to the procedure a time out was called      Time out performed at:  5/31/2024 9:30 AM    Patient states understanding of procedure being performed: Yes      Site marked: Yes      Patient identity confirmed:  Verbally with patient    Supporting Documentation:     Indications:  Pain    Procedure Details:    Prep: patient was prepped and draped in usual sterile fashion                Ethyl Chloride was applied      Needle size: 25 G G    Approach:  Dorsal    Laterality:  Right    Location: tendon sheath      Tendon Structures: Extensor Digitorum Longus      Tendon Structures comment:  3rd    Injection Information:       Medications:  2 mL  lidocaine 1 %; 20 mg triamcinolone acetonide 40 mg/mL    Patient tolerance:  Patient tolerated the procedure well with no immediate complications        Subjective:       HPI  The patient presents with chief complaint of right foot pain for about 2 months.  She has pain on dorsal right forefoot.  The symptoms have been worse since the onset.  Pain increases with walking and standing.  The patient does not recall any injury, but reports some overuse.  The patient tried OTC meds, and different shoes without relieving the pain.  No associated numbness or paresthesia.  No significant weakness or dysfunction.         The following portions of the patient's history were reviewed and updated as appropriate: allergies, current medications, past family history, past medical history, past social history, past surgical history and problem list.  All pertinent labs and images were reviewed.      Past Medical History  History reviewed. No pertinent past medical history.    Past Surgical History  History reviewed. No pertinent surgical history.     Allergies:  Clindamycin, Penicillins, and Phenazopyridine    Medications:  Current Outpatient Medications   Medication Sig Dispense Refill    carvedilol (COREG) 6.25 mg tablet TAKE 1 TABLET BY MOUTH TWICE A DAY WITH MEALS 180 tablet 1    cetirizine (ZyrTEC) 5 MG tablet Take 5 mg by mouth daily      cholecalciferol (VITAMIN D3) 1,000 units tablet Take 1,000 Units by mouth daily      FA-Cyanocobalamin-B6-D-Ca-Aloe (RX SUPPORT HB/REFLUX/ALOE PO) Take by mouth      levothyroxine 50 mcg tablet TAKE 1 TABLET BY MOUTH EVERY DAY 90 tablet 1    Mirabegron ER 25 MG TB24 Take 25 mg by mouth daily at bedtime 30 tablet 6     No current facility-administered medications for this visit.       Social History:  Social History     Socioeconomic History    Marital status: /Civil Union     Spouse name: None    Number of children: None    Years of education: None    Highest education level: None  "  Occupational History    None   Tobacco Use    Smoking status: Never    Smokeless tobacco: Never   Vaping Use    Vaping status: Never Used   Substance and Sexual Activity    Alcohol use: Yes     Comment: socially    Drug use: Never    Sexual activity: Yes     Partners: Male     Birth control/protection: None, Male Sterilization   Other Topics Concern    None   Social History Narrative    None     Social Determinants of Health     Financial Resource Strain: Low Risk  (9/6/2023)    Overall Financial Resource Strain (CARDIA)     Difficulty of Paying Living Expenses: Not hard at all   Food Insecurity: No Food Insecurity (9/6/2023)    Hunger Vital Sign     Worried About Running Out of Food in the Last Year: Never true     Ran Out of Food in the Last Year: Never true   Transportation Needs: No Transportation Needs (9/6/2023)    PRAPARE - Transportation     Lack of Transportation (Medical): No     Lack of Transportation (Non-Medical): No   Physical Activity: Not on file   Stress: Not on file   Social Connections: Not on file   Intimate Partner Violence: Not on file   Housing Stability: Low Risk  (9/6/2023)    Housing Stability Vital Sign     Unable to Pay for Housing in the Last Year: No     Number of Places Lived in the Last Year: 1     Unstable Housing in the Last Year: No          Review of Systems   Constitutional:  Negative for chills and fever.   Respiratory:  Negative for cough and shortness of breath.    Cardiovascular:  Negative for chest pain.   Gastrointestinal:  Negative for nausea and vomiting.   Musculoskeletal:  Negative for gait problem.   Skin:  Negative for wound.   Allergic/Immunologic: Negative for immunocompromised state.   Neurological:  Negative for weakness and numbness.   Hematological: Negative.    Psychiatric/Behavioral:  Negative for behavioral problems and confusion.          Objective:      /85   Pulse 73   Ht 5' 5\" (1.651 m)   Wt 116 kg (255 lb)   BMI 42.43 kg/m²          Physical " Exam  Vitals reviewed.   Constitutional:       General: She is not in acute distress.     Appearance: She is obese. She is not toxic-appearing or diaphoretic.   HENT:      Head: Normocephalic and atraumatic.   Eyes:      Extraocular Movements: Extraocular movements intact.   Cardiovascular:      Rate and Rhythm: Normal rate and regular rhythm.      Pulses: Normal pulses.           Dorsalis pedis pulses are 2+ on the right side and 2+ on the left side.        Posterior tibial pulses are 2+ on the right side and 2+ on the left side.   Pulmonary:      Effort: Pulmonary effort is normal. No respiratory distress.   Musculoskeletal:         General: Tenderness present. No signs of injury.      Cervical back: Normal range of motion and neck supple.      Right lower leg: No edema.      Left lower leg: No edema.      Right foot: No foot drop.      Left foot: No foot drop.      Comments: Focal pain along right 3rd EDL at the level of metatarsal head.     Skin:     General: Skin is warm.      Capillary Refill: Capillary refill takes less than 2 seconds.      Coloration: Skin is not cyanotic or mottled.      Findings: No abscess.      Nails: There is no clubbing.   Neurological:      General: No focal deficit present.      Mental Status: She is alert and oriented to person, place, and time.      Cranial Nerves: No cranial nerve deficit.      Sensory: No sensory deficit.      Motor: No weakness.      Coordination: Coordination normal.   Psychiatric:         Mood and Affect: Mood normal.         Behavior: Behavior normal.         Thought Content: Thought content normal.         Judgment: Judgment normal.

## 2024-06-19 ENCOUNTER — HOSPITAL ENCOUNTER (EMERGENCY)
Facility: HOSPITAL | Age: 44
Discharge: HOME | End: 2024-06-19
Attending: EMERGENCY MEDICINE
Payer: COMMERCIAL

## 2024-06-19 VITALS
DIASTOLIC BLOOD PRESSURE: 74 MMHG | RESPIRATION RATE: 20 BRPM | OXYGEN SATURATION: 100 % | SYSTOLIC BLOOD PRESSURE: 163 MMHG | HEART RATE: 76 BPM | TEMPERATURE: 98 F

## 2024-06-19 DIAGNOSIS — R31.9 HEMATURIA: Primary | ICD-10-CM

## 2024-06-19 LAB
BACTERIA UR QL AUTO: ABNORMAL /HPF
BILIRUB UR QL STRIP: NEGATIVE
CLARITY UR: CLEAR
COLOR UR: ABNORMAL
GLUCOSE UR STRIP-MCNC: NEGATIVE MG/DL
HGB UR QL STRIP.AUTO: ABNORMAL
KETONES UR STRIP-MCNC: NEGATIVE MG/DL
LEUKOCYTE ESTERASE UR QL STRIP: NEGATIVE
NITRITE UR QL STRIP: NEGATIVE
NON-SQ EPI CELLS URNS QL MICRO: ABNORMAL /HPF
PH UR STRIP.AUTO: 5.5 [PH]
PROT UR STRIP-MCNC: ABNORMAL MG/DL
RBC #/AREA URNS AUTO: ABNORMAL /HPF
SP GR UR STRIP.AUTO: 1.01 (ref 1–1.03)
UROBILINOGEN UR STRIP-ACNC: <2 MG/DL
WBC #/AREA URNS AUTO: ABNORMAL /HPF

## 2024-06-19 PROCEDURE — 81001 URINALYSIS AUTO W/SCOPE: CPT | Performed by: EMERGENCY MEDICINE

## 2024-06-19 PROCEDURE — 99283 EMERGENCY DEPT VISIT LOW MDM: CPT

## 2024-06-19 PROCEDURE — 99284 EMERGENCY DEPT VISIT MOD MDM: CPT

## 2024-06-20 ENCOUNTER — NURSE TRIAGE (OUTPATIENT)
Age: 44
End: 2024-06-20

## 2024-06-20 NOTE — DISCHARGE INSTRUCTIONS
Please follow-up with urology regarding the blood in your urine today.  Continue to monitor for further bloody urine.  Return to the emergency department if you are unable to urinate, develop abdominal pain or flank pain, fevers or chills or if your symptoms worsen.

## 2024-06-20 NOTE — TELEPHONE ENCOUNTER
Patient of Dr. Ag last seen in the [place] office called in stating she was in the ED yesterday for hematuria. States she is still having some slight blood in her urine today which appears to be tan in color. Denies any other symptoms at this time. Advised to increase water intake and monitor for worsening symptoms. ED precautions reviewed. Please advise on further recommendations.       Additional Information   Affirmative: The patient has Gross Hematuria without clots, new onset, with negative urine testing    Answer Questions - Initial Assessment  When did this start: yesterday     Do you have dysuria: no    Do you have lower back, flank, or lower abdominal/bladder pain: no    Do you have urinary frequency, urgency, or changes in your urination:no    Do you have any blood clots: no    Have you become unable to urinate: no    Is the hematuria continuous or intermittent: continuous     Can you describe the color of the blood in the urine in relation to a beverage: tan     Do you take any blood thinners: no    Have you had recent urologic surgery or procedure:no     Have you had any recent urine testing or imaging? (UA with micro, urine culture, kidney ultrasound, CT scan): ua with micro     Do you have a history of bladder cancer: no    Protocols used: Gross Hemaruria

## 2024-06-21 DIAGNOSIS — N20.0 KIDNEY STONES: Primary | ICD-10-CM

## 2024-06-21 NOTE — TELEPHONE ENCOUNTER
Patient called back to relay she did schedule CT Scan for 7/3 which was earliest option she was given. She wants to make sure this is early enough. States symptoms are unchanged at this point. Reviewed Ed precautions.

## 2024-06-21 NOTE — TELEPHONE ENCOUNTER
LM per comm consent. Patient to call CS, phone number provided to schedule CT and we will contact with results. Advised to contact office with any questions or concerns.

## 2024-07-03 ENCOUNTER — HOSPITAL ENCOUNTER (OUTPATIENT)
Dept: CT IMAGING | Facility: HOSPITAL | Age: 44
Discharge: HOME | End: 2024-07-03
Payer: COMMERCIAL

## 2024-07-03 DIAGNOSIS — N20.0 KIDNEY STONES: ICD-10-CM

## 2024-07-03 PROCEDURE — 74176 CT ABD & PELVIS W/O CONTRAST: CPT

## 2024-07-25 ENCOUNTER — OFFICE VISIT (OUTPATIENT)
Dept: UROLOGY | Facility: CLINIC | Age: 44
End: 2024-07-25
Payer: COMMERCIAL

## 2024-07-25 VITALS
BODY MASS INDEX: 43.32 KG/M2 | DIASTOLIC BLOOD PRESSURE: 90 MMHG | HEART RATE: 74 BPM | HEIGHT: 65 IN | SYSTOLIC BLOOD PRESSURE: 138 MMHG | WEIGHT: 260 LBS | OXYGEN SATURATION: 99 %

## 2024-07-25 DIAGNOSIS — R31.0 GROSS HEMATURIA: ICD-10-CM

## 2024-07-25 DIAGNOSIS — R39.15 URINARY URGENCY: Primary | ICD-10-CM

## 2024-07-25 LAB — POST-VOID RESIDUAL VOLUME, ML POC: 47 ML

## 2024-07-25 PROCEDURE — 51798 US URINE CAPACITY MEASURE: CPT

## 2024-07-25 PROCEDURE — 99214 OFFICE O/P EST MOD 30 MIN: CPT

## 2024-07-25 NOTE — PROGRESS NOTES
7/25/2024      No chief complaint on file.        Assessment and Plan    43 y.o. female managed by Dr. Ag    Nephrolithiasis  -CT (7/03/24) showing a 6 mm nonobstructing right renal stone. No hydronephrosis.     2. Urinary urgency  -On Myrbetriq, has overall improved. Sometimes urgency worse in the evenings.     3. Gross Hematuria  -patient seen in ED 6/19/24 for gross hematuria. Urine micro 6/19/24 positive for blood.  -PVR 47 mLs.  -Patient currently on menses, outpatient urine testing ordered to be completed when patient is finished with current cycle.   -urine cytology, CT urogram, cystoscopy   -I will be in contact with patient's urine and CT results. She will return for next available cystoscopy.       History of Present Illness  Abby Jonas is a 43 y.o. female here with history of nephrolithiasis and urinary urgency who presents for follow-up today.    She is doing well on the Myrbetriq. Her symptoms have much improved. Sometimes she does still experience urgency in the night time. Patient states urinary urgency increases after sex. She denies current dysuria, frequency, and urgency. No previous history of recurrent UTIs.    She did have an episode of blood in her urine  which prompted her to go to the ER on 6/19/24. Urine was negative for infection. She did not have flank pain or urinary symptoms. She states it lasted for 1 day and hasn't had any since. She does have history of kidney stones and CT scan on 7/03/24 shows a 6 mm nonobstructing right renal stone    Patient also states she noticed a lump (about the size of a pencil eraser) on the side of her urethra, she first noticed this in February 2023 but then it went away. She states it came back on the same spot this past May or June, she felt like when she was wiping it felt irriated. Lasted for about 2 weeks. She states it is not there now. Defers exam today. No concerns for STDs.     She states her great grandmother was a smoker and had  bladder cancer. She denies personal history of smoking.       Medical comorbidities include reflux disease and class III obesity    Review of Systems   Constitutional:  Negative for chills and fever.   HENT:  Negative for ear pain and sore throat.    Eyes:  Negative for pain and visual disturbance.   Respiratory:  Negative for cough and shortness of breath.    Cardiovascular:  Negative for chest pain and palpitations.   Gastrointestinal:  Negative for abdominal pain, diarrhea, nausea and vomiting.   Genitourinary:  Positive for hematuria. Negative for dysuria, flank pain, frequency and urgency.   Musculoskeletal:  Negative for arthralgias and back pain.   Skin:  Negative for color change and rash.   Neurological:  Negative for seizures and syncope.   All other systems reviewed and are negative.               Vitals  There were no vitals filed for this visit.    Physical Exam  Constitutional:       Appearance: Normal appearance.   HENT:      Head: Normocephalic.   Eyes:      Extraocular Movements: Extraocular movements intact.      Pupils: Pupils are equal, round, and reactive to light.   Pulmonary:      Effort: Pulmonary effort is normal. No respiratory distress.   Musculoskeletal:         General: Normal range of motion.      Cervical back: Normal range of motion.   Neurological:      Mental Status: She is alert and oriented to person, place, and time.   Psychiatric:         Mood and Affect: Mood normal.         Behavior: Behavior normal.         Thought Content: Thought content normal.         Judgment: Judgment normal.         Past History  Past Medical History:   Diagnosis Date    Kidney stones     Overactive bladder      Social History     Socioeconomic History    Marital status: /Civil Union     Spouse name: Not on file    Number of children: Not on file    Years of education: Not on file    Highest education level: Not on file   Occupational History    Not on file   Tobacco Use    Smoking status: Never  "   Smokeless tobacco: Never   Vaping Use    Vaping status: Never Used   Substance and Sexual Activity    Alcohol use: Yes     Comment: socially    Drug use: Never    Sexual activity: Yes     Partners: Male     Birth control/protection: None, Male Sterilization   Other Topics Concern    Not on file   Social History Narrative    Not on file     Social Determinants of Health     Financial Resource Strain: Low Risk  (9/6/2023)    Overall Financial Resource Strain (CARDIA)     Difficulty of Paying Living Expenses: Not hard at all   Food Insecurity: No Food Insecurity (9/6/2023)    Hunger Vital Sign     Worried About Running Out of Food in the Last Year: Never true     Ran Out of Food in the Last Year: Never true   Transportation Needs: No Transportation Needs (9/6/2023)    PRAPARE - Transportation     Lack of Transportation (Medical): No     Lack of Transportation (Non-Medical): No   Physical Activity: Not on file   Stress: Not on file   Social Connections: Not on file   Intimate Partner Violence: Not on file   Housing Stability: Low Risk  (9/6/2023)    Housing Stability Vital Sign     Unable to Pay for Housing in the Last Year: No     Number of Times Moved in the Last Year: 1     Homeless in the Last Year: No     Social History     Tobacco Use   Smoking Status Never   Smokeless Tobacco Never     Family History   Problem Relation Age of Onset    Heart disease Father 39        heart attack    Diabetes Father     Alcohol abuse Neg Hx     Substance Abuse Neg Hx     Mental illness Neg Hx     Depression Neg Hx        The following portions of the patient's history were reviewed and updated as appropriate: allergies, current medications, past medical history, past social history, past surgical history and problem list.    Results  No results found for this or any previous visit (from the past 1 hour(s)).]  No results found for: \"PSA\"  Lab Results   Component Value Date    CALCIUM 9.4 04/04/2024    K 4.1 04/04/2024    CO2 28 " 04/04/2024     04/04/2024    BUN 12 04/04/2024    CREATININE 0.76 04/04/2024     Lab Results   Component Value Date    WBC 10.15 04/04/2024    HGB 14.4 04/04/2024    HCT 44.5 04/04/2024    MCV 93 04/04/2024     04/04/2024       ANDRE Hernández

## 2024-08-02 ENCOUNTER — APPOINTMENT (OUTPATIENT)
Dept: LAB | Facility: CLINIC | Age: 44
End: 2024-08-02
Payer: COMMERCIAL

## 2024-08-02 DIAGNOSIS — E55.9 VITAMIN D DEFICIENCY: ICD-10-CM

## 2024-08-02 DIAGNOSIS — R39.15 URINARY URGENCY: ICD-10-CM

## 2024-08-02 DIAGNOSIS — E03.9 ACQUIRED HYPOTHYROIDISM: ICD-10-CM

## 2024-08-02 DIAGNOSIS — Z79.899 ENCOUNTER FOR LONG-TERM CURRENT USE OF MEDICATION: ICD-10-CM

## 2024-08-02 DIAGNOSIS — R31.0 GROSS HEMATURIA: ICD-10-CM

## 2024-08-02 DIAGNOSIS — Z00.00 LABORATORY EXAMINATION ORDERED AS PART OF A ROUTINE GENERAL MEDICAL EXAMINATION: ICD-10-CM

## 2024-08-02 DIAGNOSIS — I10 ESSENTIAL HYPERTENSION: ICD-10-CM

## 2024-08-02 LAB
25(OH)D3 SERPL-MCNC: 15.5 NG/ML (ref 30–100)
ALBUMIN SERPL BCG-MCNC: 3.8 G/DL (ref 3.5–5)
ALP SERPL-CCNC: 76 U/L (ref 34–104)
ALT SERPL W P-5'-P-CCNC: 12 U/L (ref 7–52)
ANION GAP SERPL CALCULATED.3IONS-SCNC: 5 MMOL/L (ref 4–13)
AST SERPL W P-5'-P-CCNC: 12 U/L (ref 13–39)
BACTERIA UR QL AUTO: ABNORMAL /HPF
BASOPHILS # BLD AUTO: 0.03 THOUSANDS/ÂΜL (ref 0–0.1)
BASOPHILS NFR BLD AUTO: 0 % (ref 0–1)
BILIRUB SERPL-MCNC: 1.04 MG/DL (ref 0.2–1)
BILIRUB UR QL STRIP: NEGATIVE
BUN SERPL-MCNC: 9 MG/DL (ref 5–25)
CALCIUM SERPL-MCNC: 9.1 MG/DL (ref 8.4–10.2)
CHLORIDE SERPL-SCNC: 103 MMOL/L (ref 96–108)
CHOLEST SERPL-MCNC: 176 MG/DL
CLARITY UR: CLEAR
CO2 SERPL-SCNC: 27 MMOL/L (ref 21–32)
COLOR UR: ABNORMAL
CREAT SERPL-MCNC: 0.72 MG/DL (ref 0.6–1.3)
EOSINOPHIL # BLD AUTO: 0.2 THOUSAND/ÂΜL (ref 0–0.61)
EOSINOPHIL NFR BLD AUTO: 2 % (ref 0–6)
ERYTHROCYTE [DISTWIDTH] IN BLOOD BY AUTOMATED COUNT: 12.9 % (ref 11.6–15.1)
GFR SERPL CREATININE-BSD FRML MDRD: 102 ML/MIN/1.73SQ M
GLUCOSE P FAST SERPL-MCNC: 94 MG/DL (ref 65–99)
GLUCOSE UR STRIP-MCNC: NEGATIVE MG/DL
HCT VFR BLD AUTO: 42.7 % (ref 34.8–46.1)
HDLC SERPL-MCNC: 52 MG/DL
HGB BLD-MCNC: 13.8 G/DL (ref 11.5–15.4)
HGB UR QL STRIP.AUTO: ABNORMAL
IMM GRANULOCYTES # BLD AUTO: 0.02 THOUSAND/UL (ref 0–0.2)
IMM GRANULOCYTES NFR BLD AUTO: 0 % (ref 0–2)
KETONES UR STRIP-MCNC: NEGATIVE MG/DL
LDLC SERPL CALC-MCNC: 107 MG/DL (ref 0–100)
LEUKOCYTE ESTERASE UR QL STRIP: NEGATIVE
LYMPHOCYTES # BLD AUTO: 1.47 THOUSANDS/ÂΜL (ref 0.6–4.47)
LYMPHOCYTES NFR BLD AUTO: 18 % (ref 14–44)
MCH RBC QN AUTO: 30 PG (ref 26.8–34.3)
MCHC RBC AUTO-ENTMCNC: 32.3 G/DL (ref 31.4–37.4)
MCV RBC AUTO: 93 FL (ref 82–98)
MONOCYTES # BLD AUTO: 0.63 THOUSAND/ÂΜL (ref 0.17–1.22)
MONOCYTES NFR BLD AUTO: 8 % (ref 4–12)
MUCOUS THREADS UR QL AUTO: ABNORMAL
NEUTROPHILS # BLD AUTO: 5.97 THOUSANDS/ÂΜL (ref 1.85–7.62)
NEUTS SEG NFR BLD AUTO: 72 % (ref 43–75)
NITRITE UR QL STRIP: NEGATIVE
NON-SQ EPI CELLS URNS QL MICRO: ABNORMAL /HPF
NONHDLC SERPL-MCNC: 124 MG/DL
NRBC BLD AUTO-RTO: 0 /100 WBCS
PH UR STRIP.AUTO: 5.5 [PH]
PLATELET # BLD AUTO: 296 THOUSANDS/UL (ref 149–390)
PMV BLD AUTO: 10.7 FL (ref 8.9–12.7)
POTASSIUM SERPL-SCNC: 4.2 MMOL/L (ref 3.5–5.3)
PROT SERPL-MCNC: 7.4 G/DL (ref 6.4–8.4)
PROT UR STRIP-MCNC: NEGATIVE MG/DL
RBC # BLD AUTO: 4.6 MILLION/UL (ref 3.81–5.12)
RBC #/AREA URNS AUTO: ABNORMAL /HPF
SODIUM SERPL-SCNC: 135 MMOL/L (ref 135–147)
SP GR UR STRIP.AUTO: 1.02 (ref 1–1.03)
TRIGL SERPL-MCNC: 86 MG/DL
TSH SERPL DL<=0.05 MIU/L-ACNC: 1.56 UIU/ML (ref 0.45–4.5)
UROBILINOGEN UR STRIP-ACNC: <2 MG/DL
VIT B12 SERPL-MCNC: 235 PG/ML (ref 180–914)
WBC # BLD AUTO: 8.32 THOUSAND/UL (ref 4.31–10.16)
WBC #/AREA URNS AUTO: ABNORMAL /HPF

## 2024-08-02 PROCEDURE — 88112 CYTOPATH CELL ENHANCE TECH: CPT | Performed by: PATHOLOGY

## 2024-08-02 PROCEDURE — 81001 URINALYSIS AUTO W/SCOPE: CPT

## 2024-08-02 PROCEDURE — 82306 VITAMIN D 25 HYDROXY: CPT

## 2024-08-02 PROCEDURE — 80061 LIPID PANEL: CPT

## 2024-08-02 PROCEDURE — 85025 COMPLETE CBC W/AUTO DIFF WBC: CPT

## 2024-08-02 PROCEDURE — 80053 COMPREHEN METABOLIC PANEL: CPT

## 2024-08-02 PROCEDURE — 36415 COLL VENOUS BLD VENIPUNCTURE: CPT

## 2024-08-02 PROCEDURE — 84443 ASSAY THYROID STIM HORMONE: CPT

## 2024-08-02 PROCEDURE — 82607 VITAMIN B-12: CPT

## 2024-08-06 PROCEDURE — 88112 CYTOPATH CELL ENHANCE TECH: CPT | Performed by: PATHOLOGY

## 2024-08-08 ENCOUNTER — HOSPITAL ENCOUNTER (OUTPATIENT)
Dept: CT IMAGING | Facility: HOSPITAL | Age: 44
Discharge: HOME | End: 2024-08-08
Payer: COMMERCIAL

## 2024-08-08 DIAGNOSIS — R39.15 URINARY URGENCY: ICD-10-CM

## 2024-08-08 DIAGNOSIS — R31.0 GROSS HEMATURIA: ICD-10-CM

## 2024-08-08 PROCEDURE — 74178 CT ABD&PLV WO CNTR FLWD CNTR: CPT

## 2024-08-08 RX ADMIN — IOHEXOL 100 ML: 350 INJECTION, SOLUTION INTRAVENOUS at 14:37

## 2024-08-09 ENCOUNTER — TELEPHONE (OUTPATIENT)
Age: 44
End: 2024-08-09

## 2024-08-09 ENCOUNTER — TELEPHONE (OUTPATIENT)
Dept: UROLOGY | Facility: CLINIC | Age: 44
End: 2024-08-09

## 2024-08-09 DIAGNOSIS — N20.0 KIDNEY STONES: Primary | ICD-10-CM

## 2024-08-09 RX ORDER — TAMSULOSIN HYDROCHLORIDE 0.4 MG/1
0.4 CAPSULE ORAL
Qty: 30 CAPSULE | Refills: 0 | Status: SHIPPED | OUTPATIENT
Start: 2024-08-09 | End: 2024-09-08

## 2024-08-09 NOTE — TELEPHONE ENCOUNTER
I discussed CT findings with patient on the phone. She has a 3 mm stone in right proximal ureter. She denies pain. She has been having urinary urgency. No fevers or chills. I sent flomax to her pharmacy. Side effect profile discussed. I encouraged her to drink 2-3 L of water daily.    Please call patient to get a renal ultrasound and KUB in 5 weeks, I would then like to see her back in the office in 6 or 7 weeks to follow-up with test results with me. Thank you

## 2024-08-09 NOTE — TELEPHONE ENCOUNTER
Immediate findings      Patient managed by Candice luo   Radiology Test results-Radiology calling with report update   CT renal protocol       Please review imaging

## 2024-08-12 NOTE — TELEPHONE ENCOUNTER
I called and gave CS number to patient. She will schedule US then will call us back to schedule follow up.

## 2024-08-16 NOTE — TELEPHONE ENCOUNTER
US is scheduled for 9/20. I scheduled patient for 10/7 at 1240 with Candice. I asked patient to call back and confirm.

## 2024-08-28 ENCOUNTER — OFFICE VISIT (OUTPATIENT)
Dept: INTERNAL MEDICINE CLINIC | Facility: CLINIC | Age: 44
End: 2024-08-28
Payer: COMMERCIAL

## 2024-08-28 VITALS
DIASTOLIC BLOOD PRESSURE: 86 MMHG | WEIGHT: 261 LBS | HEART RATE: 66 BPM | OXYGEN SATURATION: 99 % | HEIGHT: 65 IN | SYSTOLIC BLOOD PRESSURE: 130 MMHG | TEMPERATURE: 96.9 F | BODY MASS INDEX: 43.49 KG/M2

## 2024-08-28 DIAGNOSIS — N95.1 PERIMENOPAUSAL SYMPTOMS: ICD-10-CM

## 2024-08-28 DIAGNOSIS — N20.0 NEPHROLITHIASIS: Primary | ICD-10-CM

## 2024-08-28 DIAGNOSIS — E66.01 CLASS 3 SEVERE OBESITY DUE TO EXCESS CALORIES WITHOUT SERIOUS COMORBIDITY WITH BODY MASS INDEX (BMI) OF 40.0 TO 44.9 IN ADULT (HCC): ICD-10-CM

## 2024-08-28 DIAGNOSIS — E55.9 VITAMIN D DEFICIENCY: ICD-10-CM

## 2024-08-28 DIAGNOSIS — E78.49 OTHER HYPERLIPIDEMIA: ICD-10-CM

## 2024-08-28 DIAGNOSIS — I10 ESSENTIAL HYPERTENSION: ICD-10-CM

## 2024-08-28 DIAGNOSIS — E53.8 VITAMIN B12 DEFICIENCY: ICD-10-CM

## 2024-08-28 DIAGNOSIS — E03.9 ACQUIRED HYPOTHYROIDISM: ICD-10-CM

## 2024-08-28 DIAGNOSIS — R73.01 ABNORMAL FASTING GLUCOSE: ICD-10-CM

## 2024-08-28 DIAGNOSIS — Z00.00 HEALTH MAINTENANCE EXAMINATION: ICD-10-CM

## 2024-08-28 PROCEDURE — 99396 PREV VISIT EST AGE 40-64: CPT | Performed by: INTERNAL MEDICINE

## 2024-08-28 PROCEDURE — 99214 OFFICE O/P EST MOD 30 MIN: CPT | Performed by: INTERNAL MEDICINE

## 2024-08-28 RX ORDER — LEVOTHYROXINE SODIUM 50 UG/1
50 TABLET ORAL DAILY
Qty: 90 TABLET | Refills: 1 | Status: SHIPPED | OUTPATIENT
Start: 2024-08-28

## 2024-08-28 RX ORDER — CARVEDILOL 6.25 MG/1
6.25 TABLET ORAL 2 TIMES DAILY WITH MEALS
Qty: 180 TABLET | Refills: 1 | Status: SHIPPED | OUTPATIENT
Start: 2024-08-28

## 2024-08-28 RX ORDER — ERGOCALCIFEROL 1.25 MG/1
50000 CAPSULE, LIQUID FILLED ORAL WEEKLY
Qty: 12 CAPSULE | Refills: 0 | Status: CANCELLED | OUTPATIENT
Start: 2024-08-28

## 2024-08-28 RX ORDER — LANOLIN ALCOHOL/MO/W.PET/CERES
1000 CREAM (GRAM) TOPICAL DAILY
Qty: 90 TABLET | Refills: 0 | Status: SHIPPED | OUTPATIENT
Start: 2024-08-28

## 2024-08-28 NOTE — PROGRESS NOTES
Ambulatory Visit  Name: Abby Jonas      : 1980      MRN: [id number]  Encounter Provider: Jenny King MD  Encounter Date: 2024   Encounter department: [facility] INTERNAL MEDICINE    Assessment & Plan   1. Nephrolithiasis  Assessment & Plan:  Maintain adequate fluid intake daily.  Repeat imaging ordered, per Urology.  No recent symptoms.  2. Perimenopausal symptoms  Assessment & Plan:  Suspect UTI symptoms due to possible vaginal atrophy.  Follow up with gynecology.  3. Acquired hypothyroidism  Assessment & Plan:  Adequately replaced.  Orders:  -     TSH, 3rd generation with Free T4 reflex; Future; Expected date: 2025  -     levothyroxine 50 mcg tablet; Take 1 tablet (50 mcg total) by mouth daily  4. Vitamin B12 deficiency  Assessment & Plan:  Start daily supplement.  Orders:  -     vitamin B-12 (VITAMIN B-12) 1,000 mcg tablet; Take 1 tablet (1,000 mcg total) by mouth daily  -     Vitamin B12; Future; Expected date: 2025  5. Vitamin D deficiency  Assessment & Plan:  Increase D2 to 5000 units daily.  Orders:  -     Vitamin D 25 hydroxy; Future; Expected date: 2025  -     Cholecalciferol (Vitamin D3) 125 MCG (5000 UT) CAPS; Take 1 capsule (5,000 Units total) by mouth Daily at 2am  6. Other hyperlipidemia  Assessment & Plan:  LDL slightly elevated, reviewed diet.  Orders:  -     Comprehensive metabolic panel; Future; Expected date: 2025  -     Lipid panel; Future; Expected date: 2025  7. Class 3 severe obesity due to excess calories without serious comorbidity with body mass index (BMI) of 40.0 to 44.9 in adult (HCC)  Assessment & Plan:  Weight gain 6 lbs since last visit.  Reviewed diet, increase protein with every meal.  Briefly discussed GLP-1, she would consider Zepbound.  8. Abnormal fasting glucose  9. Essential hypertension  -     carvedilol (COREG) 6.25 mg tablet; Take 1 tablet (6.25 mg total) by mouth 2 (two) times a day with meals  10. Health  maintenance examination  Comments:  Updated.    Follow up in 6 months or as needed.      Depression Screening and Follow-up Plan: Patient was screened for depression during today's encounter. They screened negative with a PHQ-2 score of 0.      History of Present Illness     She continues to experience urinary symptoms.  She would experience urgency to urinate, experiences occasional burning at the end of urination.  She reports no recent blood in the urine.  She reports no further pelvic discomfort or lower abdominal pain.  Denies any nausea or vomiting.  She has been to urology, diagnosed with a kidney stone.  She does drink a lot of water on a daily basis.    She reports that her menstrual period has been irregular for the last few years, since her mother in law had a stroke.  She did not have it for a few months after the stroke and then had it irregularly afterwards.  She reports her regular monthly.  The last few months, the other month was 36 days.  She reports no hot flashes, mood swings.  She is frustrated about her weight.  She eats 3 regular meals a day, knows that she is usually lacking in protein.    She continues to travel for work every 2 weeks.      Review of Systems   Constitutional:  Negative for appetite change and fatigue.   HENT:  Negative for congestion, ear pain and postnasal drip.    Eyes:  Negative for visual disturbance.   Respiratory:  Negative for cough and shortness of breath.    Cardiovascular:  Negative for chest pain and leg swelling.   Gastrointestinal:  Negative for abdominal pain, constipation and diarrhea.   Genitourinary:  Positive for dysuria and urgency. Negative for frequency.   Musculoskeletal:  Negative for arthralgias, back pain and myalgias.   Skin:  Negative for rash and wound.   Neurological:  Negative for dizziness, numbness and headaches.   Hematological:  Does not bruise/bleed easily.   Psychiatric/Behavioral:  Negative for confusion. The patient is not  "nervous/anxious.        Objective     /86   Pulse 66   Temp (!) 96.9 °F (36.1 °C)   Ht 5' 5\" (1.651 m)   Wt 118 kg (261 lb)   SpO2 99%   BMI 43.43 kg/m²     Physical Exam  Vitals and nursing note reviewed.   Constitutional:       General: She is not in acute distress.     Appearance: She is well-developed.   HENT:      Head: Normocephalic and atraumatic.      Mouth/Throat:      Mouth: Mucous membranes are moist.   Eyes:      Pupils: Pupils are equal, round, and reactive to light.   Cardiovascular:      Rate and Rhythm: Normal rate and regular rhythm.      Heart sounds: Normal heart sounds.   Pulmonary:      Effort: Pulmonary effort is normal.      Breath sounds: Normal breath sounds. No wheezing.   Abdominal:      General: Bowel sounds are normal.      Palpations: Abdomen is soft.   Musculoskeletal:         General: No swelling.      Right lower leg: No edema.      Left lower leg: No edema.   Skin:     General: Skin is warm.      Findings: No rash.   Neurological:      General: No focal deficit present.      Mental Status: She is alert and oriented to person, place, and time.   Psychiatric:         Mood and Affect: Mood and affect normal. Mood is not anxious or depressed.         Behavior: Behavior normal.       Administrative Statements       Labs & imaging results reviewed with patient.    "

## 2024-08-28 NOTE — ASSESSMENT & PLAN NOTE
Weight gain 6 lbs since last visit.  Reviewed diet, increase protein with every meal.  Briefly discussed GLP-1, she would consider Zepbound.

## 2024-09-20 ENCOUNTER — HOSPITAL ENCOUNTER (OUTPATIENT)
Dept: ULTRASOUND IMAGING | Facility: HOSPITAL | Age: 44
Discharge: HOME | End: 2024-09-20
Payer: COMMERCIAL

## 2024-09-20 DIAGNOSIS — N20.0 KIDNEY STONES: ICD-10-CM

## 2024-09-20 PROCEDURE — 76775 US EXAM ABDO BACK WALL LIM: CPT

## 2024-09-23 ENCOUNTER — OFFICE VISIT (OUTPATIENT)
Dept: INTERNAL MEDICINE CLINIC | Facility: CLINIC | Age: 44
End: 2024-09-23
Payer: COMMERCIAL

## 2024-09-23 VITALS
SYSTOLIC BLOOD PRESSURE: 138 MMHG | HEART RATE: 68 BPM | BODY MASS INDEX: 42.65 KG/M2 | WEIGHT: 256 LBS | OXYGEN SATURATION: 98 % | HEIGHT: 65 IN | DIASTOLIC BLOOD PRESSURE: 84 MMHG | TEMPERATURE: 97.8 F

## 2024-09-23 DIAGNOSIS — U07.1 COVID-19 VIRUS INFECTION: Primary | ICD-10-CM

## 2024-09-23 DIAGNOSIS — J02.9 SORE THROAT: ICD-10-CM

## 2024-09-23 LAB — S PYO AG THROAT QL: NEGATIVE

## 2024-09-23 PROCEDURE — 99213 OFFICE O/P EST LOW 20 MIN: CPT | Performed by: INTERNAL MEDICINE

## 2024-09-23 PROCEDURE — 87880 STREP A ASSAY W/OPTIC: CPT | Performed by: INTERNAL MEDICINE

## 2024-09-23 NOTE — PROGRESS NOTES
Ambulatory Visit  Name: Abby Jonas      : 1980      MRN: [id number]  Encounter Provider: Jenny King MD  Encounter Date: 2024   Encounter department: [facility] INTERNAL MEDICINE    Assessment & Plan  COVID-19 virus infection  Start Flonase daily, may use saline spray prn.  Cough/congestion improving.         Sore throat  Rapid strep negative, suspect due to post nasal drip. GERD less likely.    Orders:    POCT rapid ANTIGEN strepA       History of Present Illness     She was in [place] 2 weeks ago. She reports sore throat and some cold symptoms at that time. She tested negative 2 days later.  Later in the week, she reports persistent symptoms, with sore throat and chest congestion. She tested (+) for COVID .  She reports sore throat comes and goes.  She is having trouble sleeping because of the pain.  She reports trouble swallowing due to pain.  She has been using saline gargles with minimal improvement. She reports chest congestion has improved, no cough. She reports sputum production has also improved. Denies any fever or chills, no GI symptoms.  She has been taking cough medication and a decongestant.    Sore Throat   This is a new problem. The current episode started 1 to 4 weeks ago. The problem has been waxing and waning. Neither side of throat is experiencing more pain than the other. There has been no fever. The fever has been present for Less than 1 day. The pain is at a severity of 5/10. The pain is moderate. Associated symptoms include congestion, coughing, a hoarse voice and trouble swallowing. Pertinent negatives include no abdominal pain, diarrhea, drooling, ear discharge, ear pain, headaches, plugged ear sensation, neck pain, shortness of breath, stridor, swollen glands or vomiting.         Review of Systems   Constitutional:  Negative for fever.   HENT:  Positive for congestion, hoarse voice, sore throat and trouble swallowing. Negative for drooling, ear  "discharge and ear pain.    Respiratory:  Positive for cough. Negative for shortness of breath and stridor.    Cardiovascular:  Negative for chest pain.   Gastrointestinal:  Negative for abdominal pain, diarrhea and vomiting.   Genitourinary:  Negative for dysuria.   Musculoskeletal:  Negative for arthralgias and neck pain.   Neurological:  Negative for headaches.           Objective     /84 (BP Location: Left arm, Patient Position: Sitting, Cuff Size: Standard)   Pulse 68   Temp 97.8 °F (36.6 °C)   Ht 5' 5\" (1.651 m)   Wt 116 kg (256 lb)   SpO2 98%   Breastfeeding No   BMI 42.60 kg/m²     Physical Exam  Vitals and nursing note reviewed.   Constitutional:       Appearance: She is well-developed.   HENT:      Head: Normocephalic and atraumatic.      Right Ear: Tympanic membrane, ear canal and external ear normal.      Left Ear: Tympanic membrane, ear canal and external ear normal.      Nose: Congestion and rhinorrhea present.      Mouth/Throat:      Mouth: Mucous membranes are moist.      Pharynx: Oropharynx is clear. No pharyngeal swelling, oropharyngeal exudate or posterior oropharyngeal erythema.   Eyes:      Conjunctiva/sclera: Conjunctivae normal.      Pupils: Pupils are equal, round, and reactive to light.   Cardiovascular:      Rate and Rhythm: Normal rate and regular rhythm.      Heart sounds: Normal heart sounds.   Pulmonary:      Effort: Pulmonary effort is normal.      Breath sounds: Normal breath sounds. No wheezing or rales.   Abdominal:      General: Bowel sounds are normal.   Lymphadenopathy:      Cervical: No cervical adenopathy.   Skin:     General: Skin is warm.      Findings: No rash.   Neurological:      Mental Status: She is alert and oriented to person, place, and time.   Psychiatric:         Mood and Affect: Mood normal.         "

## 2024-10-04 ENCOUNTER — ANNUAL EXAM (OUTPATIENT)
Dept: OBGYN CLINIC | Facility: CLINIC | Age: 44
End: 2024-10-04

## 2024-10-04 VITALS
WEIGHT: 258.8 LBS | SYSTOLIC BLOOD PRESSURE: 147 MMHG | DIASTOLIC BLOOD PRESSURE: 86 MMHG | HEART RATE: 64 BPM | BODY MASS INDEX: 43.12 KG/M2 | HEIGHT: 65 IN

## 2024-10-04 DIAGNOSIS — N95.1 PERIMENOPAUSAL SYMPTOMS: ICD-10-CM

## 2024-10-04 DIAGNOSIS — N95.8 GENITOURINARY SYNDROME OF MENOPAUSE: ICD-10-CM

## 2024-10-04 DIAGNOSIS — Z12.4 CERVICAL CANCER SCREENING: ICD-10-CM

## 2024-10-04 DIAGNOSIS — N94.10 FEMALE DYSPAREUNIA: ICD-10-CM

## 2024-10-04 DIAGNOSIS — Z01.419 WOMEN'S ANNUAL ROUTINE GYNECOLOGICAL EXAMINATION: Primary | ICD-10-CM

## 2024-10-04 DIAGNOSIS — Z12.31 ENCOUNTER FOR SCREENING MAMMOGRAM FOR MALIGNANT NEOPLASM OF BREAST: ICD-10-CM

## 2024-10-04 PROCEDURE — 87624 HPV HI-RISK TYP POOLED RSLT: CPT | Performed by: NURSE PRACTITIONER

## 2024-10-04 PROCEDURE — 88175 CYTOPATH C/V AUTO FLUID REDO: CPT | Performed by: NURSE PRACTITIONER

## 2024-10-04 PROCEDURE — 99396 PREV VISIT EST AGE 40-64: CPT | Performed by: NURSE PRACTITIONER

## 2024-10-04 RX ORDER — ESTRADIOL 0.1 MG/G
CREAM VAGINAL
Qty: 42.5 G | Refills: 2 | Status: SHIPPED | OUTPATIENT
Start: 2024-10-04 | End: 2025-10-06

## 2024-10-04 NOTE — PROGRESS NOTES
ANNUAL GYNECOLOGICAL EXAMINATION    Abby Jonas is a 43 y.o. female who presents today for annual GYN exam.  Her last pap smear was performed a few years ago and result was normal per patient.  She reports no history of abnormal pap smears in her past.  Her last mammogram was performed  and result was normal per patient. She reports menses as irregular over the past year or so. Cycles range from her normal 28 day cycle for a few months and then has cycles between 28-50 days in length.  Patient's last menstrual period was 2024 (approximate).  She has recently been experiencing some perimenopausal symptoms including hot flashes, excessive sweating, trouble staying asleep. She has also noted some discomfort with intercourse, vaginal/vulvar tissue feels thin and she is having urinary issues. She had recently had a kidney stone which passed on its own. Many of the urinary symptoms have improved improved since passing the stone but she has continued to intermittently have the sensation of urinary urgency, urinary frequency and a tingling/irritation sensation at the urethral opening which makes her feel like she needs to void. She has been following with Urology as well. Her general medical history has been reviewed and she reports it as follows:    Past Medical History:   Diagnosis Date    Kidney stones     Overactive bladder      History reviewed. No pertinent surgical history.  OB History          0    Para   0    Term   0       0    AB   0    Living   0         SAB   0    IAB   0    Ectopic   0    Multiple   0    Live Births   0               Social History     Tobacco Use    Smoking status: Never    Smokeless tobacco: Never   Vaping Use    Vaping status: Never Used   Substance Use Topics    Alcohol use: Yes     Comment: socially    Drug use: Never     Social History     Substance and Sexual Activity   Sexual Activity Yes    Partners: Male    Birth control/protection: None, Male  "Sterilization     Cancer-related family history includes Cancer in her maternal grandmother.    Current Outpatient Medications   Medication Instructions    carvedilol (COREG) 6.25 mg, Oral, 2 times daily with meals    cetirizine (ZYRTEC) 5 mg, Oral, Daily    levothyroxine 50 mcg, Oral, Daily    vitamin B-12 (VITAMIN B-12) 1,000 mcg, Oral, Daily    Vitamin D3 5,000 Units, Oral, Daily  (0200)       Review of Systems:  Review of Systems   Constitutional: Negative.    Gastrointestinal: Negative.    Genitourinary:  Positive for dyspareunia, frequency and urgency.       Physical Exam:  /86 (BP Location: Left arm, Patient Position: Sitting)   Pulse 64   Ht 5' 5\" (1.651 m)   Wt 117 kg (258 lb 12.8 oz)   LMP 09/18/2024 (Approximate)   BMI 43.07 kg/m²   Physical Exam  Constitutional:       General: She is not in acute distress.     Appearance: Normal appearance.   Genitourinary:      Vulva and bladder normal.      No lesions in the vagina.      No vaginal erythema or ulceration.      Mild vaginal atrophy present.       Right Adnexa: not tender and no mass present.     Left Adnexa: not tender and no mass present.     No cervical motion tenderness or lesion.      Uterus is not enlarged or tender.      No uterine mass detected.  Breasts:     Right: No mass, nipple discharge or skin change.      Left: No mass, nipple discharge or skin change.   Cardiovascular:      Rate and Rhythm: Normal rate and regular rhythm.   Pulmonary:      Effort: Pulmonary effort is normal.      Breath sounds: Normal breath sounds.   Abdominal:      General: Abdomen is flat.      Palpations: Abdomen is soft.   Musculoskeletal:      Cervical back: Neck supple.   Neurological:      Mental Status: She is alert.   Skin:     General: Skin is warm and dry.   Psychiatric:         Mood and Affect: Mood normal.         Behavior: Behavior normal.   Vitals reviewed.       Assessment/Plan:   1. Normal well-woman GYN exam.  2. Cervical cancer screening:  " Normal cervical exam.  Pap smear done with HPV co-testing.      3. STD screening:  Patient declines vaginal GC/CT cultures.  Declines serum anti-HIV, anti-HCV, HbsAg, syphilis panel.   4. Breast cancer screening:  Normal breast exam.Scheduled next month for bilateral screening mammogram.  Reviewed breast self-awareness.   5. Depression Screening: Patient's depression screening was assessed with a PHQ-2 score of 0. Clinically patient does not have depression. No treatment is required.  Referral placed for  consultation.   6. BMI Counseling: Body mass index is 43.07 kg/m². Discussed the patient's BMI with her. The BMI is above normal. Exercise recommendations include exercising 3-5 times per week.   7. Contraception:  partner sterilization    8. Discussed use of vaginal estrogen for vaginal atrophy, dyspareunia and urinary symptoms. Reviewed risk/benefit and instructions for use. Rx sent to pharmacy.    9. Referral placed for menopause specialist Dr. Sharita Nichole        Reviewed with patient that test results are available in [employer] immediately, but that they will not necessarily be reviewed by me immediately.  Explained that I will review results at my earliest opportunity and contact patient appropriately.

## 2024-10-06 NOTE — PROGRESS NOTES
10/7/2024      No chief complaint on file.        Assessment and Plan    43 y.o. female    Nephrolithiasis  -Renal US unremarkable. No calculi. No hydronephrosis. (9/20/24)     2. Urinary urgency  -previously on myrbetriq. Content off of pharmacotherapy.      3. Gross Hematuria  -patient seen in ED 6/19/24 for gross hematuria. Urine micro 6/19/24 positive for blood.  -patient's CT renal protocol (8/08/24) showed a 3 mm calculus in the right proximal ureter. No suspicious renal mass or upper tract urothelial lesion.  -patient's urine cytology (8/02/24) [facility]  -patient will return for scheduled cystoscopy with Dr. Ag on 12/18/24. Patient will then return for AP follow-up in April 2025.              History of Present Illness  Abby Jonas is a 43 y.o. female here with history of nephrolithiasis and urinary urgency who presents for follow-up today.      She denies current dysuria, frequency, and urgency. No previous history of recurrent UTIs.     She did have an episode of blood in her urine  which prompted her to go to the ER on 6/19/24. Urine was negative for infection. She did not have flank pain or urinary symptoms. She states it lasted for 1 day and hasn't had any since. She states her great grandmother was a smoker and had bladder cancer. She denies personal history of smoking.      Patient's renal US  is unremarkable. Patient denies voiding complaints.      She states she was started on vaginal estrogen for vaginal atrophy by GYN.         Review of Systems   Constitutional:  Negative for chills and fever.   HENT:  Negative for ear pain and sore throat.    Eyes:  Negative for pain and visual disturbance.   Respiratory:  Negative for cough and shortness of breath.    Cardiovascular:  Negative for chest pain and palpitations.   Gastrointestinal:  Negative for abdominal pain, constipation, nausea and vomiting.   Genitourinary:  Negative for difficulty urinating, dysuria, frequency, hematuria and urgency.    Musculoskeletal:  Negative for arthralgias and back pain.   Skin:  Negative for color change and rash.   Neurological:  Negative for seizures and syncope.   All other systems reviewed and are negative.               Vitals  There were no vitals filed for this visit.    Physical Exam  Constitutional:       Appearance: Normal appearance.   HENT:      Head: Normocephalic.   Eyes:      Extraocular Movements: Extraocular movements intact.      Pupils: Pupils are equal, round, and reactive to light.   Pulmonary:      Effort: Pulmonary effort is normal. No respiratory distress.   Musculoskeletal:         General: Normal range of motion.      Cervical back: Normal range of motion.   Neurological:      Mental Status: She is alert and oriented to person, place, and time.   Psychiatric:         Mood and Affect: Mood normal.         Behavior: Behavior normal.         Thought Content: Thought content normal.         Judgment: Judgment normal.           Past History  Past Medical History:   Diagnosis Date    Kidney stones     Overactive bladder      Social History     Socioeconomic History    Marital status: /Civil Union     Spouse name: Not on file    Number of children: Not on file    Years of education: Not on file    Highest education level: Not on file   Occupational History    Not on file   Tobacco Use    Smoking status: Never    Smokeless tobacco: Never   Vaping Use    Vaping status: Never Used   Substance and Sexual Activity    Alcohol use: Yes     Comment: socially    Drug use: Never    Sexual activity: Yes     Partners: Male     Birth control/protection: None, Male Sterilization   Other Topics Concern    Not on file   Social History Narrative    Not on file     Social Determinants of Health     Financial Resource Strain: Low Risk  (10/3/2024)    Overall Financial Resource Strain (CARDIA)     Difficulty of Paying Living Expenses: Not hard at all   Food Insecurity: No Food Insecurity (10/3/2024)    Hunger Vital  "Sign     Worried About Running Out of Food in the Last Year: Never true     Ran Out of Food in the Last Year: Never true   Transportation Needs: No Transportation Needs (10/3/2024)    PRAPARE - Transportation     Lack of Transportation (Medical): No     Lack of Transportation (Non-Medical): No   Physical Activity: Not on file   Stress: Not on file   Social Connections: Not on file   Intimate Partner Violence: Not on file   Housing Stability: Low Risk  (10/3/2024)    Housing Stability Vital Sign     Unable to Pay for Housing in the Last Year: No     Number of Times Moved in the Last Year: 1     Homeless in the Last Year: No     Social History     Tobacco Use   Smoking Status Never   Smokeless Tobacco Never     Family History   Problem Relation Age of Onset    Heart disease Father 39        heart attack    Diabetes Father     No Known Problems Brother     Cancer Maternal Grandmother     No Known Problems Maternal Grandfather     Heart disease Paternal Grandmother     Diabetes type II Paternal Grandfather     Alcohol abuse Neg Hx     Substance Abuse Neg Hx     Mental illness Neg Hx     Depression Neg Hx        The following portions of the patient's history were reviewed and updated as appropriate: allergies, current medications, past medical history, past social history, past surgical history and problem list.    Results  No results found for this or any previous visit (from the past 1 hour(s)).]  No results found for: \"PSA\"  Lab Results   Component Value Date    CALCIUM 9.1 08/02/2024    K 4.2 08/02/2024    CO2 27 08/02/2024     08/02/2024    BUN 9 08/02/2024    CREATININE 0.72 08/02/2024     Lab Results   Component Value Date    WBC 8.32 08/02/2024    HGB 13.8 08/02/2024    HCT 42.7 08/02/2024    MCV 93 08/02/2024     08/02/2024       ANDRE Hernández  "

## 2024-10-07 ENCOUNTER — OFFICE VISIT (OUTPATIENT)
Dept: UROLOGY | Facility: CLINIC | Age: 44
End: 2024-10-07
Payer: COMMERCIAL

## 2024-10-07 VITALS
HEART RATE: 69 BPM | BODY MASS INDEX: 43.32 KG/M2 | OXYGEN SATURATION: 99 % | WEIGHT: 260 LBS | HEIGHT: 65 IN | SYSTOLIC BLOOD PRESSURE: 130 MMHG | DIASTOLIC BLOOD PRESSURE: 72 MMHG

## 2024-10-07 DIAGNOSIS — N20.0 KIDNEY STONES: Primary | ICD-10-CM

## 2024-10-07 LAB
HPV HR 12 DNA CVX QL NAA+PROBE: NEGATIVE
HPV16 DNA CVX QL NAA+PROBE: NEGATIVE
HPV18 DNA CVX QL NAA+PROBE: NEGATIVE

## 2024-10-07 PROCEDURE — 99214 OFFICE O/P EST MOD 30 MIN: CPT

## 2024-10-15 LAB
LAB AP GYN PRIMARY INTERPRETATION: NORMAL
Lab: NORMAL

## 2024-10-16 ENCOUNTER — TELEPHONE (OUTPATIENT)
Dept: OBGYN CLINIC | Facility: CLINIC | Age: 44
End: 2024-10-16

## 2024-10-16 NOTE — TELEPHONE ENCOUNTER
Called patient and left  with office number to call back, please inform patient of pap smear result.    ----- Message from ANDRE Brown sent at 10/16/2024  7:50 AM EDT -----  Please let her know the pap smear is normal. Thank you!

## 2024-11-04 ENCOUNTER — TELEPHONE (OUTPATIENT)
Dept: UROLOGY | Facility: CLINIC | Age: 44
End: 2024-11-04

## 2024-11-04 ENCOUNTER — OFFICE VISIT (OUTPATIENT)
Dept: UROLOGY | Facility: CLINIC | Age: 44
End: 2024-11-04
Payer: COMMERCIAL

## 2024-11-04 VITALS
HEART RATE: 67 BPM | SYSTOLIC BLOOD PRESSURE: 148 MMHG | HEIGHT: 65 IN | WEIGHT: 259 LBS | OXYGEN SATURATION: 100 % | DIASTOLIC BLOOD PRESSURE: 80 MMHG | BODY MASS INDEX: 43.15 KG/M2

## 2024-11-04 DIAGNOSIS — N32.89 BLADDER SPASM: Primary | ICD-10-CM

## 2024-11-04 LAB
BACTERIA UR QL AUTO: NORMAL /HPF
BILIRUB UR QL STRIP: NEGATIVE
CLARITY UR: CLEAR
COLOR UR: NORMAL
GLUCOSE UR STRIP-MCNC: NEGATIVE MG/DL
HGB UR QL STRIP.AUTO: NEGATIVE
KETONES UR STRIP-MCNC: NEGATIVE MG/DL
LEUKOCYTE ESTERASE UR QL STRIP: NEGATIVE
NITRITE UR QL STRIP: NEGATIVE
NON-SQ EPI CELLS URNS QL MICRO: NORMAL /HPF
PH UR STRIP.AUTO: 6 [PH]
PROT UR STRIP-MCNC: NEGATIVE MG/DL
RBC #/AREA URNS AUTO: NORMAL /HPF
SL AMB  POCT GLUCOSE, UA: NORMAL
SL AMB LEUKOCYTE ESTERASE,UA: NORMAL
SL AMB POCT BILIRUBIN,UA: NORMAL
SL AMB POCT BLOOD,UA: NORMAL
SL AMB POCT CLARITY,UA: CLEAR
SL AMB POCT COLOR,UA: YELLOW
SL AMB POCT KETONES,UA: NORMAL
SL AMB POCT NITRITE,UA: NORMAL
SL AMB POCT PH,UA: 6
SL AMB POCT SPECIFIC GRAVITY,UA: 1.02
SL AMB POCT URINE PROTEIN: NORMAL
SL AMB POCT UROBILINOGEN: 0.2
SP GR UR STRIP.AUTO: 1.02 (ref 1–1.03)
UROBILINOGEN UR STRIP-ACNC: <2 MG/DL
WBC #/AREA URNS AUTO: NORMAL /HPF

## 2024-11-04 PROCEDURE — 81002 URINALYSIS NONAUTO W/O SCOPE: CPT

## 2024-11-04 PROCEDURE — 99213 OFFICE O/P EST LOW 20 MIN: CPT

## 2024-11-04 PROCEDURE — 87086 URINE CULTURE/COLONY COUNT: CPT

## 2024-11-04 PROCEDURE — 81001 URINALYSIS AUTO W/SCOPE: CPT

## 2024-11-04 RX ORDER — OXYBUTYNIN CHLORIDE 5 MG/1
5 TABLET ORAL 2 TIMES DAILY
Qty: 14 TABLET | Refills: 0 | Status: SHIPPED | OUTPATIENT
Start: 2024-11-04 | End: 2024-11-11

## 2024-11-04 NOTE — TELEPHONE ENCOUNTER
Pt saw ANDRE Harvey today and verbally confirmed appt for 12/11 8:20am    Please assist in making that appt slot. Thank you.      Provider note:  Return in about 5 weeks (around 12/9/2024) for Return in 4-6 weeks AP./.

## 2024-11-04 NOTE — PROGRESS NOTES
11/4/2024      No chief complaint on file.        Assessment and Plan    43 y.o. female     Nephrolithiasis  -Renal US unremarkable. No calculi. No hydronephrosis. (9/20/24)  -Patient was traveling in [place] and [place] in October. Patient initially went to ER in [facility] with right flank pain and bladder pressure. Patient also states at that time she had a slight fever. She states urinalysis was negative for infection and that they told her she had a 5mm kidney stone 2/3rds down her right ureter. She states she was told she had no kidney swelling. Patient was discharged. She then states she went to ER in [facility], ibuprofen was not helping right flank pain. She states he had another CT scan with and without contrast and was told she had a 3 mm right ureteral stone. They also repeated urine testing and was negative for infection. She states labs were all normal. She was given phloroglucinol for bladder colic. She states this helped. She was also given a form of flomax.  -Patient is no longer having right flank pain. She states she is doing better. She does have intermittent right lower quadrant discomfort that comes and goes. She also has bladder spasms. She states last week she did have some blood in her urine. Denies N/V. Denies fever and chills.   -I did sent her a short course of oxybutynin for bladder spasms. Side effect profile discussed. Patient previously took flomax and states this made her feel woozy.   -Urine sent for micro and culture today.  -We discussed ordering a renal US to assess for hydronephrosis, patient does not feel she needs this as she is feeling better and states that she was told she did not have hydronephrosis. We discussed that we will plan to repeat imaging if patient's symptoms return or worsen.  -We will also plan to see patient back in 4-6 weeks for follow-up.      History of Present Illness  Abby Jonas is a 43 y.o. female here with hx of nephrolithiasis presenting for follow-up today.  "    Review of Systems   Constitutional:  Negative for chills and fever.   HENT:  Negative for ear pain and sore throat.    Eyes:  Negative for pain and visual disturbance.   Respiratory:  Negative for cough and shortness of breath.    Cardiovascular:  Negative for chest pain and palpitations.   Gastrointestinal:  Positive for abdominal pain. Negative for constipation, nausea and vomiting.        RLQ intermittent discomfort   Genitourinary:  Positive for hematuria. Negative for difficulty urinating, dysuria, flank pain, frequency and urgency.   Musculoskeletal:  Negative for arthralgias and back pain.   Skin:  Negative for color change and rash.   Neurological:  Negative for seizures and syncope.   All other systems reviewed and are negative.               Vitals  Vitals:    11/04/24 0802   BP: 148/80   BP Location: Left arm   Patient Position: Sitting   Cuff Size: Standard   Pulse: 67   SpO2: 100%   Weight: 117 kg (259 lb)   Height: 5' 5\" (1.651 m)       Physical Exam  Constitutional:       Appearance: Normal appearance.   HENT:      Head: Normocephalic.   Eyes:      Extraocular Movements: Extraocular movements intact.      Pupils: Pupils are equal, round, and reactive to light.   Pulmonary:      Effort: Pulmonary effort is normal. No respiratory distress.   Musculoskeletal:         General: Normal range of motion.      Cervical back: Normal range of motion.   Neurological:      Mental Status: She is alert and oriented to person, place, and time.   Psychiatric:         Mood and Affect: Mood normal.         Behavior: Behavior normal.         Thought Content: Thought content normal.         Judgment: Judgment normal.           Past History  Past Medical History:   Diagnosis Date    Kidney stones     Overactive bladder      Social History     Socioeconomic History    Marital status: /Civil Union     Spouse name: None    Number of children: None    Years of education: None    Highest education level: None "   Occupational History    None   Tobacco Use    Smoking status: Never    Smokeless tobacco: Never   Vaping Use    Vaping status: Never Used   Substance and Sexual Activity    Alcohol use: Yes     Comment: socially    Drug use: Never    Sexual activity: Yes     Partners: Male     Birth control/protection: None, Male Sterilization   Other Topics Concern    None   Social History Narrative    None     Social Determinants of Health     Financial Resource Strain: Low Risk  (10/3/2024)    Overall Financial Resource Strain (CARDIA)     Difficulty of Paying Living Expenses: Not hard at all   Food Insecurity: No Food Insecurity (10/3/2024)    Nursing - Inadequate Food Risk Classification     Worried About Running Out of Food in the Last Year: Never true     Ran Out of Food in the Last Year: Never true     Ran Out of Food in the Last Year: Not on file   Transportation Needs: No Transportation Needs (10/3/2024)    PRAPARE - Transportation     Lack of Transportation (Medical): No     Lack of Transportation (Non-Medical): No   Physical Activity: Not on file   Stress: Not on file   Social Connections: Not on file   Intimate Partner Violence: Not on file   Housing Stability: Low Risk  (10/3/2024)    Housing Stability Vital Sign     Unable to Pay for Housing in the Last Year: No     Number of Times Moved in the Last Year: 1     Homeless in the Last Year: No     Social History     Tobacco Use   Smoking Status Never   Smokeless Tobacco Never     Family History   Problem Relation Age of Onset    Heart disease Father 39        heart attack    Diabetes Father     No Known Problems Brother     Cancer Maternal Grandmother     No Known Problems Maternal Grandfather     Heart disease Paternal Grandmother     Diabetes type II Paternal Grandfather     Alcohol abuse Neg Hx     Substance Abuse Neg Hx     Mental illness Neg Hx     Depression Neg Hx        The following portions of the patient's history were reviewed and updated as appropriate:  "allergies, current medications, past medical history, past social history, past surgical history and problem list.    Results  No results found for this or any previous visit (from the past 1 hour(s)).]  No results found for: \"PSA\"  Lab Results   Component Value Date    CALCIUM 9.1 08/02/2024    K 4.2 08/02/2024    CO2 27 08/02/2024     08/02/2024    BUN 9 08/02/2024    CREATININE 0.72 08/02/2024     Lab Results   Component Value Date    WBC 8.32 08/02/2024    HGB 13.8 08/02/2024    HCT 42.7 08/02/2024    MCV 93 08/02/2024     08/02/2024       ANDRE Hernández  "

## 2024-11-05 LAB — BACTERIA UR CULT: NORMAL

## 2024-11-07 ENCOUNTER — HOSPITAL ENCOUNTER (OUTPATIENT)
Dept: MAMMOGRAPHY | Facility: HOSPITAL | Age: 44
Discharge: HOME | End: 2024-11-07
Payer: COMMERCIAL

## 2024-11-07 VITALS — BODY MASS INDEX: 43.15 KG/M2 | WEIGHT: 259 LBS | HEIGHT: 65 IN

## 2024-11-07 DIAGNOSIS — Z12.31 ENCOUNTER FOR SCREENING MAMMOGRAM FOR MALIGNANT NEOPLASM OF BREAST: ICD-10-CM

## 2024-11-07 PROCEDURE — 77063 BREAST TOMOSYNTHESIS BI: CPT

## 2024-11-07 PROCEDURE — 77067 SCR MAMMO BI INCL CAD: CPT

## 2024-11-18 ENCOUNTER — APPOINTMENT (OUTPATIENT)
Dept: LAB | Facility: CLINIC | Age: 44
End: 2024-11-18
Payer: COMMERCIAL

## 2024-11-18 ENCOUNTER — PATIENT MESSAGE (OUTPATIENT)
Dept: UROLOGY | Facility: CLINIC | Age: 44
End: 2024-11-18

## 2024-11-18 DIAGNOSIS — N39.0 URINARY TRACT INFECTION WITHOUT HEMATURIA, SITE UNSPECIFIED: ICD-10-CM

## 2024-11-18 DIAGNOSIS — N39.0 URINARY TRACT INFECTION WITHOUT HEMATURIA, SITE UNSPECIFIED: Primary | ICD-10-CM

## 2024-11-18 LAB
BACTERIA UR QL AUTO: ABNORMAL /HPF
BILIRUB UR QL STRIP: NEGATIVE
CLARITY UR: CLEAR
COLOR UR: ABNORMAL
GLUCOSE UR STRIP-MCNC: NEGATIVE MG/DL
HGB UR QL STRIP.AUTO: NEGATIVE
KETONES UR STRIP-MCNC: NEGATIVE MG/DL
LEUKOCYTE ESTERASE UR QL STRIP: NEGATIVE
NITRITE UR QL STRIP: NEGATIVE
NON-SQ EPI CELLS URNS QL MICRO: ABNORMAL /HPF
PH UR STRIP.AUTO: 5.5 [PH]
PROT UR STRIP-MCNC: NEGATIVE MG/DL
RBC #/AREA URNS AUTO: ABNORMAL /HPF
SP GR UR STRIP.AUTO: 1.02 (ref 1–1.03)
UROBILINOGEN UR STRIP-ACNC: <2 MG/DL
WBC #/AREA URNS AUTO: ABNORMAL /HPF

## 2024-11-18 PROCEDURE — 81001 URINALYSIS AUTO W/SCOPE: CPT

## 2024-11-18 PROCEDURE — 87086 URINE CULTURE/COLONY COUNT: CPT

## 2024-11-18 RX ORDER — NITROFURANTOIN 25; 75 MG/1; MG/1
100 CAPSULE ORAL 2 TIMES DAILY
Qty: 14 CAPSULE | Refills: 0 | Status: SHIPPED | OUTPATIENT
Start: 2024-11-18 | End: 2024-11-25

## 2024-11-19 LAB — BACTERIA UR CULT: NORMAL

## 2024-11-20 ENCOUNTER — RESULTS FOLLOW-UP (OUTPATIENT)
Dept: UROLOGY | Facility: CLINIC | Age: 44
End: 2024-11-20

## 2024-11-22 ENCOUNTER — HOSPITAL ENCOUNTER (OUTPATIENT)
Dept: ULTRASOUND IMAGING | Facility: HOSPITAL | Age: 44
End: 2024-11-22
Payer: COMMERCIAL

## 2024-11-22 DIAGNOSIS — N20.0 KIDNEY STONES: ICD-10-CM

## 2024-11-22 DIAGNOSIS — N20.0 KIDNEY STONES: Primary | ICD-10-CM

## 2024-11-22 PROCEDURE — 76770 US EXAM ABDO BACK WALL COMP: CPT

## 2024-11-25 DIAGNOSIS — E53.8 VITAMIN B12 DEFICIENCY: ICD-10-CM

## 2024-11-25 RX ORDER — LANOLIN ALCOHOL/MO/W.PET/CERES
1000 CREAM (GRAM) TOPICAL DAILY
Qty: 90 TABLET | Refills: 1 | Status: SHIPPED | OUTPATIENT
Start: 2024-11-25

## 2024-11-27 DIAGNOSIS — E03.9 ACQUIRED HYPOTHYROIDISM: ICD-10-CM

## 2024-11-28 RX ORDER — LEVOTHYROXINE SODIUM 50 UG/1
50 TABLET ORAL DAILY
Qty: 90 TABLET | Refills: 2 | Status: SHIPPED | OUTPATIENT
Start: 2024-11-28

## 2024-12-02 ENCOUNTER — RESULTS FOLLOW-UP (OUTPATIENT)
Dept: UROLOGY | Facility: CLINIC | Age: 44
End: 2024-12-02

## 2024-12-03 NOTE — PROGRESS NOTES
NEPHROLOGY OUTPATIENT CONSULTATION   Abby Jonas 43 y.o. female MRN: [id number]  Date: 12/9/2024  Reason for consultation:   Chief Complaint   Patient presents with    Consult    Nephrolithiasis       ASSESSMENT AND PLAN:  Assessment & Plan  Nephrolithiasis  -Onset: since 2000 then second episode in 2012 and now 2 episodes in 2024 with complaints of right flank pain   -Imaging: CT from April 2024 was positive for 5 mm right renal pole stone. CT abdomen from July 2024 was suggestive of left kidney with 6 mm nonobstructing right renal stone.  She was found to have ureteral calculus on imaging studies done in [place] and [place]. She gets urethral pain and pressure with ureteral stones   -last kidney ultrasound from November 2024 did not show any nephrolithiasis or ureteral calculus  -family history- father and grandmother  -Interventions: none   -Stone Analysis:none   -Stone Risk Profile:ordered   -Renal function has been stable, last blood work showed creatinine 0.72 mg/dL  -Plan:   Stressed on oral hydration more than 70 ounces per day.  Stressed on hydration throughout the day to maintain urine output at least 2000 mL/day.  BMP , PTH, uric acid ordered   Recommend low oxalate, Low Sodium and high calcium diet at discussed about risk factor for nephrolithiasis recommend avoiding vitamin C supplementation.  List provided for kidney stone diet  Essential hypertension  -Current medication:  coreg 6.25 mg bid      -Current blood pressure:  elevated   -had gained weight recently.   -Plan:     Started on losartan 25 mg daily. Continue coreg.  Recommended to bring home blood pressure cuff to the office for correlation  -Recommend 2 g sodium diet.    -Recommend daily exercise and weight loss  -Discussed home monitoring of BP and maintaining a log of blood pressure.  -Recommend goal BP less than 130/80.   Vitamin D deficiency  -level 15.5. On vitamin D 5000 unit daily/ monitor and management per PCP.  Asymptomatic  microscopic hematuria  UA was positive for trace blood, urine microscopy showed 2-4 RBC per high-powered field.  No crystals.  Agree with plan for cystoscopy as recommended by urology.  Continue to monitor for microscopic hematuria.  Possibility of microscopic hematuria in the setting of nephrolithiasis          Diagnoses and all orders for this visit:    Nephrolithiasis  -     Ambulatory Referral to Nephrology  -     losartan (COZAAR) 25 mg tablet; Take 1 tablet (25 mg total) by mouth daily  -     Basic metabolic panel; Future  -     PTH, intact; Future  -     Uric acid; Future  -     Stone risk profile; Future  -     Basic metabolic panel; Future  -     Protein / creatinine ratio, urine; Future  -     Urinalysis with microscopic; Future    Essential hypertension  -     Stone risk profile; Future  -     Basic metabolic panel; Future  -     Protein / creatinine ratio, urine; Future  -     Urinalysis with microscopic; Future    Vitamin D deficiency    Asymptomatic microscopic hematuria        Patient Instructions   ->BP elevated.  -Started on losartan 25 mg daily. Continue coreg.   -Recommend 2 g sodium diet.    -Recommend daily exercise and weight loss  -Discussed home monitoring of BP and maintaining a log of blood pressure.  -Recommend goal BP less than 130/80. Please inform me if SBP below 110 or above 140's persistently.    Nephrolithiasis  -Plan:   Stressed on oral hydration more than 70 ounces per day.  Stressed on hydration throughout the day to maintain urine output at least 2000 mL/day.  Recommend low oxalate, Low Sodium and high calcium diet    Follow up: 6  months with repeat Lab work within a week of the scheduled office visit. Will discuss the results of the previsit Labs during the office visit.        Patient Education     Kidney stone diet   The Basics   Written by the doctors and editors at [facility]   What are kidney stones? -- Kidney stones are just what they sound like: small stones that form  "inside the kidneys. They form when salts and minerals that are normally in the urine build up and harden. They can be made of different substances.  Kidney stones usually get carried out of the body when you urinate. But sometimes, they can get stuck on the way out (figure 1). If this happens, it can cause:   Pain in your side, back, or in the lower part of your belly   Blood in the urine (which can make urine pink or red)   Nausea or vomiting   Pain when you urinate   Needing to urinate in a hurry  Why do I need a special diet? -- Depending on what your stones are made of, changing your diet might help lower the chances of new stones forming.  Your doctor or nurse might recommend diet changes as part of your treatment plan if you have:   Calcium oxalate stones - When your body digests certain foods, it makes a waste product called \"oxalate.\" If you have too much oxalate in your urine, crystals can form and stick together to make a kidney stone.   Uric acid stones - When your body digests substances called purines, which are found in some foods, it makes a waste product called \"uric acid.\" Kidney stones can form when too much uric acid builds up in your body.   Other types of stones - Your body needs a balance of the right amounts of fluids and minerals to work well. Changes in certain minerals or how much you drink can affect your risk of kidney stones.  What can I eat and drink on a kidney stone diet? -- There is no specific diet plan to prevent kidney stones. Foods that are good to eat for 1 type of kidney stone might not be good to eat with another type of kidney stone. Your diet recommendations might be based on the exact type of kidney stone you have.  General recommendations include:   Eat healthy - Try to eat a healthy diet with plenty of vegetables, fruits, whole grains, and low-fat dairy products. It might help to add extra fruits and vegetables, especially those that are high in potassium.   Get plenty of " "fluids - Drinking more water throughout the day is one of the best ways to help lower your risk of all types of kidney stones.   Limit salt - Limiting the amount of salt in your diet can also lower the risk of kidney stones. Salt is also called \"sodium.\"  Some foods that are generally OK to eat:   Grains - Whole-grain breads, pastas, cereals, rice, English muffins, and bagels. Cooked hot cereals such as oatmeal and cream of wheat (not instant cereals). Unsalted crackers and snack foods.   Fruits - Most fresh, frozen, or canned fruits in their own juices. Fruit juices without added sugar, cherries, oranges, melons, peaches, pears, kiwi, apples, papaya, bananas. Dried fruit without added sugars.   Vegetables - Fresh or frozen vegetables, canned vegetables without salt, potatoes, tomatoes, squash, bell peppers, beets, carrots, beans.   Dairy - Low-fat or fat-free milk, yogurt, and cheese.   Meats, poultry, seafood, and proteins - Eat a moderate amount of protein. Good sources of protein for most people with kidney stones include dried beans, peas, lentils, tofu, and walnuts. Other nuts and nut butters might be good sources of protein to eat, based on the kind of kidney stone you have.   Condiments and other foods - Fresh or dried herbs, lemon juice, seasonings without salt, mustard, vinegar, hot sauce.   Fluids - Drink plenty of fluids such as water, unsweetened tea, and coffee.  What foods and drinks should I avoid or limit on a kidney stone diet? -- Depending on what type of kidney stone you have had, limiting certain foods might help lower the risk of new stones forming.  For example:   Calcium oxalate stones - Limit foods and drinks with a lot of oxalate. Examples include spinach, rhubarb, strawberries, chocolate, almonds, peanuts, pecans, beets, tea, whole-wheat products, non-dairy animal proteins like meat and eggs, and foods high in added sucrose and fructose, which are types of sugar. Do not take vitamin C or " calcium supplements.   Uric acid stones - Limit foods with purines. Examples include oats, whole milk and whole-milk products, asparagus, spinach, and certain meats, fish, and poultry.  Your doctor or nurse can talk to you about whether it makes sense to avoid or limit certain foods. It can also help to work with a dietitian (food expert). They can help you make sure that your body is getting the nutrients it needs.  What else should I know? -- Getting the right amount of calcium in your diet is important for healthy bones. But having too much or too little calcium can cause some types of kidney stones to form. Talk with your doctor, nurse, or dietitian about how much calcium you should have. Talk to them before taking calcium or vitamin D supplements.  Depending on your weight and health, it might help to try to lose weight. Keeping a healthy body weight can help prevent kidney stones.  All topics are updated as new evidence becomes available and our peer review process is complete.  This topic retrieved from [employer] on: Mar 09, 2024.  Topic 473794 Version 2.0  Release: 32.2.4 - C32.67  © 2024 UpToDate, Inc. and/or its affiliates. All rights reserved.  figure 1: Anatomy of the urinary tract     Urine is made by the kidneys. It passes from the kidneys into the bladder through 2 tubes called the ureters. Then, it leaves the bladder through another tube called the urethra.  Graphic 79866 Version 8.0  Consumer Information Use and Disclaimer   Disclaimer: This generalized information is a limited summary of diagnosis, treatment, and/or medication information. It is not meant to be comprehensive and should be used as a tool to help the user understand and/or assess potential diagnostic and treatment options. It does NOT include all information about conditions, treatments, medications, side effects, or risks that may apply to a specific patient. It is not intended to be medical advice or a substitute for the medical  advice, diagnosis, or treatment of a health care provider based on the health care provider's examination and assessment of a patient's specific and unique circumstances. Patients must speak with a health care provider for complete information about their health, medical questions, and treatment options, including any risks or benefits regarding use of medications. This information does not endorse any treatments or medications as safe, effective, or approved for treating a specific patient. UpToDate, Inc. and its affiliates disclaim any warranty or liability relating to this information or the use thereof.The use of this information is governed by the Terms of Use, available at https://www.[employer]/en/know/clinical-effectiveness-terms. 2024© UpToDate, Inc. and its affiliates and/or licensors. All rights reserved.  Copyright   © 2024 UpToDate, Inc. and/or its affiliates. All rights reserved.         HISTORY OF PRESENT ILLNESS:  Abby Jonas is a 43 y.o. year old female with medical issues of nephrolithiasis, HTN since age 40 yrs  who presents for initial consultation for nephrolithiasis.  -Urology note :  patient went to ER in [place] with right flank pain and fever.  Was found to have 5 mm kidney stone 2/3rd way down her right ureter  -> She was subsequently in [place] and went to ER in [place] as she had worsening right flank pain and had another CAT scan which was suggestive of a 3 mm right ureteral stone.  Was given Flomax as well.  During the last urology visit patient was given prescription for oxybutynin for bladder spasm.  She also was ordered kidney ultrasound.  Reviewed kidney ultrasound result from 11/22/2024.  No finding of hydronephrosis or calculi.  No significant postvoid volume.  Right kidney 12.3 and left kidney 11.1 cm in size  -Reviewed blood work from 8/2/2024 which showed renal function stable at creatinine 0.72 mg/dL.  Vitamin D level was slightly low at 15.5.  Hemoglobin was 13.8 g/dL.   UA from 11/18/2024 showed 2-4 RBC per high-power field and 4-10 WBC per high-power field    REVIEW OF SYSTEMS:    Review of Systems   Constitutional:  Negative for activity change, appetite change, chills, diaphoresis, fatigue and fever.   HENT:  Negative for congestion, facial swelling and nosebleeds.    Eyes:  Negative for pain and visual disturbance.   Respiratory:  Negative for cough, chest tightness and shortness of breath.    Cardiovascular:  Negative for chest pain and palpitations.   Gastrointestinal:  Negative for abdominal distention, abdominal pain, diarrhea, nausea and vomiting.   Genitourinary:  Negative for difficulty urinating, dysuria, flank pain, frequency and hematuria.   Musculoskeletal:  Negative for arthralgias, back pain and joint swelling.   Skin:  Negative for rash.   Neurological:  Negative for dizziness, seizures, syncope, weakness and headaches.   Psychiatric/Behavioral:  Negative for agitation and confusion. The patient is not nervous/anxious.        More than 10 point review of systems were obtained and discussed in length with the patient.     PAST MEDICAL HISTORY:  Past Medical History:   Diagnosis Date    Hypertension     Kidney stone     Kidney stones     Overactive bladder        PAST SURGICAL HISTORY:  History reviewed. No pertinent surgical history.    ALLERGIES:  Allergies   Allergen Reactions    Clindamycin Hives    Penicillins Hives     Category: Allergy;          SOCIAL HISTORY:  Social History     Substance and Sexual Activity   Alcohol Use Yes    Comment: socially     Social History     Substance and Sexual Activity   Drug Use Never     Social History     Tobacco Use   Smoking Status Never   Smokeless Tobacco Never       FAMILY HISTORY:  Family History   Problem Relation Age of Onset    No Known Problems Mother     Heart disease Father 39        heart attack    Diabetes Father     Nephrolithiasis Father     No Known Problems Brother     Cancer Maternal Grandmother     No  "Known Problems Maternal Grandfather     Heart disease Paternal Grandmother     Diabetes type II Paternal Grandfather     Alcohol abuse Neg Hx     Substance Abuse Neg Hx     Mental illness Neg Hx     Depression Neg Hx        MEDICATIONS:    Current Outpatient Medications:     carvedilol (COREG) 6.25 mg tablet, Take 1 tablet (6.25 mg total) by mouth 2 (two) times a day with meals, Disp: 180 tablet, Rfl: 1    cetirizine (ZyrTEC) 5 MG tablet, Take 5 mg by mouth daily, Disp: , Rfl:     Cholecalciferol (Vitamin D3) 125 MCG (5000 UT) CAPS, Take 1 capsule (5,000 Units total) by mouth Daily at 2am, Disp: 90 capsule, Rfl: 1    levothyroxine 50 mcg tablet, TAKE 1 TABLET BY MOUTH EVERY DAY, Disp: 90 tablet, Rfl: 2    losartan (COZAAR) 25 mg tablet, Take 1 tablet (25 mg total) by mouth daily, Disp: 90 tablet, Rfl: 3    vitamin B-12 (VITAMIN B-12) 1,000 mcg tablet, TAKE 1 TABLET BY MOUTH EVERY DAY, Disp: 90 tablet, Rfl: 1    estradiol (ESTRACE VAGINAL) 0.1 mg/g vaginal cream, Insert 1 g into the vagina daily for 7 days, THEN 1 g 2 (two) times a week., Disp: 42.5 g, Rfl: 2    Current Facility-Administered Medications:     lidocaine (XYLOCAINE) 1 % injection 2 mL, 2 mL, Infiltration, , , 2 mL at 05/31/24 0900    triamcinolone acetonide (KENALOG-40) 40 mg/mL injection 20 mg, 20 mg, Infiltration, , , 20 mg at 05/31/24 0900      PHYSICAL EXAM:  Vitals:    12/09/24 1326 12/09/24 1342   BP: 142/88 146/88   BP Location: Right arm    Patient Position: Sitting    Cuff Size: Large    Pulse: 65    Weight: 119 kg (263 lb)    Height: 5' 5\" (1.651 m)      Body mass index is 43.77 kg/m².  Wt Readings from Last 3 Encounters:   12/09/24 119 kg (263 lb)   11/07/24 117 kg (259 lb)   11/04/24 117 kg (259 lb)     Physical Exam  Constitutional:       General: She is not in acute distress.     Appearance: Normal appearance. She is well-developed.   HENT:      Head: Normocephalic and atraumatic.      Nose: Nose normal.      Mouth/Throat:      Mouth: " Mucous membranes are moist.   Eyes:      General: No scleral icterus.     Conjunctiva/sclera: Conjunctivae normal.      Pupils: Pupils are equal, round, and reactive to light.   Neck:      Thyroid: No thyromegaly.      Vascular: No JVD.   Cardiovascular:      Rate and Rhythm: Normal rate and regular rhythm.      Heart sounds: Normal heart sounds. No murmur heard.     No friction rub.   Pulmonary:      Effort: Pulmonary effort is normal. No respiratory distress.      Breath sounds: Normal breath sounds. No wheezing or rales.   Abdominal:      General: Bowel sounds are normal. There is no distension.      Palpations: Abdomen is soft.      Tenderness: There is no abdominal tenderness.   Musculoskeletal:         General: No deformity.      Cervical back: Neck supple.      Right lower leg: No edema.      Left lower leg: No edema.   Skin:     General: Skin is warm and dry.      Findings: No rash.   Neurological:      Mental Status: She is alert and oriented to person, place, and time.   Psychiatric:         Mood and Affect: Mood normal.         Behavior: Behavior normal.         Thought Content: Thought content normal.           Lab Results:   Results for orders placed or performed in visit on 11/18/24   Urine culture    Collection Time: 11/18/24  5:25 PM    Specimen: Urine, Clean Catch   Result Value Ref Range    Urine Culture <10,000 cfu/ml    Urinalysis with microscopic    Collection Time: 11/18/24  5:25 PM   Result Value Ref Range    Color, UA Light Yellow     Clarity, UA Clear     Specific Gravity, UA 1.024 1.003 - 1.030    pH, UA 5.5 4.5, 5.0, 5.5, 6.0, 6.5, 7.0, 7.5, 8.0    Leukocytes, UA Negative Negative    Nitrite, UA Negative Negative    Protein, UA Negative Negative mg/dl    Glucose, UA Negative Negative mg/dl    Ketones, UA Negative Negative mg/dl    Urobilinogen, UA <2.0 <2.0 mg/dl mg/dl    Bilirubin, UA Negative Negative    Occult Blood, UA Negative Negative    RBC, UA 2-4 (A) None Seen, 1-2 /hpf    WBC, UA  "4-10 (A) None Seen, 1-2 /hpf    Epithelial Cells Occasional None Seen, Occasional /hpf    Bacteria, UA Occasional None Seen, Occasional /hpf             Invalid input(s): \"ALBUMIN\"  Lab Results:         Invalid input(s): \"ALBUMIN\"    Laboratory Results:  Lab Results   Component Value Date    WBC 8.32 08/02/2024    HGB 13.8 08/02/2024    HCT 42.7 08/02/2024    MCV 93 08/02/2024     08/02/2024     Lab Results   Component Value Date    SODIUM 135 08/02/2024    K 4.2 08/02/2024     08/02/2024    CO2 27 08/02/2024    BUN 9 08/02/2024    CREATININE 0.72 08/02/2024    GLUC 87 04/04/2024    CALCIUM 9.1 08/02/2024     Lab Results   Component Value Date    CALCIUM 9.1 08/02/2024     No results found for: \"LABPROT\"  [unfilled]  Lab Results   Component Value Date    CALCIUM 9.1 08/02/2024     [unfilled]   Portions of the record may have been created with voice recognition software. Occasional wrong word or \"sound a like\" substitutions may have occurred due to the inherent limitations of voice recognition software. Read the chart carefully and recognize, using context, where substitutions have occurred.    "

## 2024-12-09 ENCOUNTER — OFFICE VISIT (OUTPATIENT)
Dept: NEPHROLOGY | Facility: CLINIC | Age: 44
End: 2024-12-09
Payer: COMMERCIAL

## 2024-12-09 VITALS
BODY MASS INDEX: 43.82 KG/M2 | WEIGHT: 263 LBS | HEART RATE: 65 BPM | HEIGHT: 65 IN | DIASTOLIC BLOOD PRESSURE: 88 MMHG | SYSTOLIC BLOOD PRESSURE: 146 MMHG

## 2024-12-09 DIAGNOSIS — N20.0 NEPHROLITHIASIS: Primary | ICD-10-CM

## 2024-12-09 DIAGNOSIS — E55.9 VITAMIN D DEFICIENCY: ICD-10-CM

## 2024-12-09 DIAGNOSIS — I10 ESSENTIAL HYPERTENSION: ICD-10-CM

## 2024-12-09 DIAGNOSIS — R31.21 ASYMPTOMATIC MICROSCOPIC HEMATURIA: ICD-10-CM

## 2024-12-09 LAB
SL AMB  POCT GLUCOSE, UA: NORMAL
SL AMB LEUKOCYTE ESTERASE,UA: NORMAL
SL AMB POCT BILIRUBIN,UA: NORMAL
SL AMB POCT BLOOD,UA: NORMAL
SL AMB POCT CLARITY,UA: CLEAR
SL AMB POCT COLOR,UA: YELLOW
SL AMB POCT KETONES,UA: NORMAL
SL AMB POCT NITRITE,UA: NORMAL
SL AMB POCT PH,UA: 6
SL AMB POCT SPECIFIC GRAVITY,UA: 1.01
SL AMB POCT URINE PROTEIN: NORMAL
SL AMB POCT UROBILINOGEN: 0.2

## 2024-12-09 PROCEDURE — 81002 URINALYSIS NONAUTO W/O SCOPE: CPT | Performed by: INTERNAL MEDICINE

## 2024-12-09 PROCEDURE — 99204 OFFICE O/P NEW MOD 45 MIN: CPT | Performed by: INTERNAL MEDICINE

## 2024-12-09 RX ORDER — LOSARTAN POTASSIUM 25 MG/1
25 TABLET ORAL DAILY
Qty: 90 TABLET | Refills: 3 | Status: SHIPPED | OUTPATIENT
Start: 2024-12-09

## 2024-12-09 NOTE — ASSESSMENT & PLAN NOTE
-Current medication:  coreg 6.25 mg bid      -Current blood pressure:  elevated   -had gained weight recently.   -Plan:     Started on losartan 25 mg daily. Continue coreg.  Recommended to bring home blood pressure cuff to the office for correlation  -Recommend 2 g sodium diet.    -Recommend daily exercise and weight loss  -Discussed home monitoring of BP and maintaining a log of blood pressure.  -Recommend goal BP less than 130/80.

## 2024-12-09 NOTE — PATIENT INSTRUCTIONS
"->BP elevated.  -Started on losartan 25 mg daily. Continue coreg.   -Recommend 2 g sodium diet.    -Recommend daily exercise and weight loss  -Discussed home monitoring of BP and maintaining a log of blood pressure.  -Recommend goal BP less than 130/80. Please inform me if SBP below 110 or above 140's persistently.    Nephrolithiasis  -Plan:   Stressed on oral hydration more than 70 ounces per day.  Stressed on hydration throughout the day to maintain urine output at least 2000 mL/day.  Recommend low oxalate, Low Sodium and high calcium diet    Follow up: 6  months with repeat Lab work within a week of the scheduled office visit. Will discuss the results of the previsit Labs during the office visit.        Patient Education     Kidney stone diet   The Basics   Written by the doctors and editors at [facility]   What are kidney stones? -- Kidney stones are just what they sound like: small stones that form inside the kidneys. They form when salts and minerals that are normally in the urine build up and harden. They can be made of different substances.  Kidney stones usually get carried out of the body when you urinate. But sometimes, they can get stuck on the way out (figure 1). If this happens, it can cause:   Pain in your side, back, or in the lower part of your belly   Blood in the urine (which can make urine pink or red)   Nausea or vomiting   Pain when you urinate   Needing to urinate in a hurry  Why do I need a special diet? -- Depending on what your stones are made of, changing your diet might help lower the chances of new stones forming.  Your doctor or nurse might recommend diet changes as part of your treatment plan if you have:   Calcium oxalate stones - When your body digests certain foods, it makes a waste product called \"oxalate.\" If you have too much oxalate in your urine, crystals can form and stick together to make a kidney stone.   Uric acid stones - When your body digests substances called purines, " "which are found in some foods, it makes a waste product called \"uric acid.\" Kidney stones can form when too much uric acid builds up in your body.   Other types of stones - Your body needs a balance of the right amounts of fluids and minerals to work well. Changes in certain minerals or how much you drink can affect your risk of kidney stones.  What can I eat and drink on a kidney stone diet? -- There is no specific diet plan to prevent kidney stones. Foods that are good to eat for 1 type of kidney stone might not be good to eat with another type of kidney stone. Your diet recommendations might be based on the exact type of kidney stone you have.  General recommendations include:   Eat healthy - Try to eat a healthy diet with plenty of vegetables, fruits, whole grains, and low-fat dairy products. It might help to add extra fruits and vegetables, especially those that are high in potassium.   Get plenty of fluids - Drinking more water throughout the day is one of the best ways to help lower your risk of all types of kidney stones.   Limit salt - Limiting the amount of salt in your diet can also lower the risk of kidney stones. Salt is also called \"sodium.\"  Some foods that are generally OK to eat:   Grains - Whole-grain breads, pastas, cereals, rice, English muffins, and bagels. Cooked hot cereals such as oatmeal and cream of wheat (not instant cereals). Unsalted crackers and snack foods.   Fruits - Most fresh, frozen, or canned fruits in their own juices. Fruit juices without added sugar, cherries, oranges, melons, peaches, pears, kiwi, apples, papaya, bananas. Dried fruit without added sugars.   Vegetables - Fresh or frozen vegetables, canned vegetables without salt, potatoes, tomatoes, squash, bell peppers, beets, carrots, beans.   Dairy - Low-fat or fat-free milk, yogurt, and cheese.   Meats, poultry, seafood, and proteins - Eat a moderate amount of protein. Good sources of protein for most people with kidney " stones include dried beans, peas, lentils, tofu, and walnuts. Other nuts and nut butters might be good sources of protein to eat, based on the kind of kidney stone you have.   Condiments and other foods - Fresh or dried herbs, lemon juice, seasonings without salt, mustard, vinegar, hot sauce.   Fluids - Drink plenty of fluids such as water, unsweetened tea, and coffee.  What foods and drinks should I avoid or limit on a kidney stone diet? -- Depending on what type of kidney stone you have had, limiting certain foods might help lower the risk of new stones forming.  For example:   Calcium oxalate stones - Limit foods and drinks with a lot of oxalate. Examples include spinach, rhubarb, strawberries, chocolate, almonds, peanuts, pecans, beets, tea, whole-wheat products, non-dairy animal proteins like meat and eggs, and foods high in added sucrose and fructose, which are types of sugar. Do not take vitamin C or calcium supplements.   Uric acid stones - Limit foods with purines. Examples include oats, whole milk and whole-milk products, asparagus, spinach, and certain meats, fish, and poultry.  Your doctor or nurse can talk to you about whether it makes sense to avoid or limit certain foods. It can also help to work with a dietitian (food expert). They can help you make sure that your body is getting the nutrients it needs.  What else should I know? -- Getting the right amount of calcium in your diet is important for healthy bones. But having too much or too little calcium can cause some types of kidney stones to form. Talk with your doctor, nurse, or dietitian about how much calcium you should have. Talk to them before taking calcium or vitamin D supplements.  Depending on your weight and health, it might help to try to lose weight. Keeping a healthy body weight can help prevent kidney stones.  All topics are updated as new evidence becomes available and our peer review process is complete.  This topic retrieved from  [employer] on: Mar 09, 2024.  Topic 151787 Version 2.0  Release: 32.2.4 - C32.67  © 2024 UpToDate, Inc. and/or its affiliates. All rights reserved.  figure 1: Anatomy of the urinary tract     Urine is made by the kidneys. It passes from the kidneys into the bladder through 2 tubes called the ureters. Then, it leaves the bladder through another tube called the urethra.  Graphic 82197 Version 8.0  Consumer Information Use and Disclaimer   Disclaimer: This generalized information is a limited summary of diagnosis, treatment, and/or medication information. It is not meant to be comprehensive and should be used as a tool to help the user understand and/or assess potential diagnostic and treatment options. It does NOT include all information about conditions, treatments, medications, side effects, or risks that may apply to a specific patient. It is not intended to be medical advice or a substitute for the medical advice, diagnosis, or treatment of a health care provider based on the health care provider's examination and assessment of a patient's specific and unique circumstances. Patients must speak with a health care provider for complete information about their health, medical questions, and treatment options, including any risks or benefits regarding use of medications. This information does not endorse any treatments or medications as safe, effective, or approved for treating a specific patient. UpToDate, Inc. and its affiliates disclaim any warranty or liability relating to this information or the use thereof.The use of this information is governed by the Terms of Use, available at https://www.wolterskluwer.com/en/know/clinical-effectiveness-terms. 2024© UpToDate, Inc. and its affiliates and/or licensors. All rights reserved.  Copyright   © 2024 UpToDate, Inc. and/or its affiliates. All rights reserved.

## 2024-12-09 NOTE — ASSESSMENT & PLAN NOTE
-Onset: since 2000 then second episode in 2012 and now 2 episodes in 2024 with complaints of right flank pain   -Imaging: CT from April 2024 was positive for 5 mm right renal pole stone. CT abdomen from July 2024 was suggestive of left kidney with 6 mm nonobstructing right renal stone.  She was found to have ureteral calculus on imaging studies done in [place] and [place]. She gets urethral pain and pressure with ureteral stones   -last kidney ultrasound from November 2024 did not show any nephrolithiasis or ureteral calculus  -family history- father and grandmother  -Interventions: none   -Stone Analysis:none   -Stone Risk Profile:ordered   -Renal function has been stable, last blood work showed creatinine 0.72 mg/dL  -Plan:   Stressed on oral hydration more than 70 ounces per day.  Stressed on hydration throughout the day to maintain urine output at least 2000 mL/day.  BMP , PTH, uric acid ordered   Recommend low oxalate, Low Sodium and high calcium diet at discussed about risk factor for nephrolithiasis recommend avoiding vitamin C supplementation.  List provided for kidney stone diet

## 2024-12-12 ENCOUNTER — APPOINTMENT (OUTPATIENT)
Dept: LAB | Facility: CLINIC | Age: 44
End: 2024-12-12
Payer: COMMERCIAL

## 2024-12-12 DIAGNOSIS — N20.0 NEPHROLITHIASIS: ICD-10-CM

## 2024-12-12 DIAGNOSIS — I10 ESSENTIAL HYPERTENSION: ICD-10-CM

## 2024-12-12 LAB
ANION GAP SERPL CALCULATED.3IONS-SCNC: 7 MMOL/L (ref 4–13)
BUN SERPL-MCNC: 10 MG/DL (ref 5–25)
CALCIUM SERPL-MCNC: 9.1 MG/DL (ref 8.4–10.2)
CHLORIDE SERPL-SCNC: 104 MMOL/L (ref 96–108)
CO2 SERPL-SCNC: 27 MMOL/L (ref 21–32)
CREAT SERPL-MCNC: 0.75 MG/DL (ref 0.6–1.3)
GFR SERPL CREATININE-BSD FRML MDRD: 97 ML/MIN/1.73SQ M
GLUCOSE P FAST SERPL-MCNC: 90 MG/DL (ref 65–99)
POTASSIUM SERPL-SCNC: 4.1 MMOL/L (ref 3.5–5.3)
SODIUM SERPL-SCNC: 138 MMOL/L (ref 135–147)
URATE SERPL-MCNC: 5.8 MG/DL (ref 2–7.5)

## 2024-12-12 PROCEDURE — 83970 ASSAY OF PARATHORMONE: CPT

## 2024-12-12 PROCEDURE — 80048 BASIC METABOLIC PNL TOTAL CA: CPT

## 2024-12-12 PROCEDURE — 36415 COLL VENOUS BLD VENIPUNCTURE: CPT

## 2024-12-12 PROCEDURE — 84550 ASSAY OF BLOOD/URIC ACID: CPT

## 2024-12-13 ENCOUNTER — RESULTS FOLLOW-UP (OUTPATIENT)
Dept: NEPHROLOGY | Facility: CLINIC | Age: 44
End: 2024-12-13

## 2024-12-13 ENCOUNTER — APPOINTMENT (OUTPATIENT)
Dept: LAB | Facility: CLINIC | Age: 44
End: 2024-12-13
Payer: COMMERCIAL

## 2024-12-13 LAB — PTH-INTACT SERPL-MCNC: 27.2 PG/ML (ref 12–88)

## 2024-12-13 PROCEDURE — 82131 AMINO ACIDS SINGLE QUANT: CPT

## 2024-12-13 PROCEDURE — 83945 ASSAY OF OXALATE: CPT

## 2024-12-13 PROCEDURE — 84300 ASSAY OF URINE SODIUM: CPT

## 2024-12-13 PROCEDURE — 82570 ASSAY OF URINE CREATININE: CPT

## 2024-12-13 PROCEDURE — 83935 ASSAY OF URINE OSMOLALITY: CPT

## 2024-12-13 PROCEDURE — 82340 ASSAY OF CALCIUM IN URINE: CPT

## 2024-12-13 PROCEDURE — 84133 ASSAY OF URINE POTASSIUM: CPT

## 2024-12-13 PROCEDURE — 84560 ASSAY OF URINE/URIC ACID: CPT

## 2024-12-13 PROCEDURE — 82507 ASSAY OF CITRATE: CPT

## 2024-12-13 PROCEDURE — 81003 URINALYSIS AUTO W/O SCOPE: CPT

## 2024-12-13 PROCEDURE — 84105 ASSAY OF URINE PHOSPHORUS: CPT

## 2024-12-13 PROCEDURE — 83735 ASSAY OF MAGNESIUM: CPT

## 2024-12-13 PROCEDURE — 82140 ASSAY OF AMMONIA: CPT

## 2024-12-13 PROCEDURE — 82436 ASSAY OF URINE CHLORIDE: CPT

## 2024-12-13 PROCEDURE — 84392 ASSAY OF URINE SULFATE: CPT

## 2024-12-13 NOTE — RESULT ENCOUNTER NOTE
Please inform patient that renal function is stable and PTH and uric acid at normal range.

## 2024-12-16 NOTE — TELEPHONE ENCOUNTER
Patient called in asking if she can get cysto done while on her period as she doesn't wear tampons. Please advise.

## 2024-12-18 ENCOUNTER — PROCEDURE VISIT (OUTPATIENT)
Dept: UROLOGY | Facility: CLINIC | Age: 44
End: 2024-12-18
Payer: COMMERCIAL

## 2024-12-18 VITALS
WEIGHT: 260 LBS | OXYGEN SATURATION: 97 % | BODY MASS INDEX: 43.32 KG/M2 | SYSTOLIC BLOOD PRESSURE: 150 MMHG | HEART RATE: 75 BPM | DIASTOLIC BLOOD PRESSURE: 90 MMHG | HEIGHT: 65 IN

## 2024-12-18 DIAGNOSIS — R31.0 GROSS HEMATURIA: Primary | ICD-10-CM

## 2024-12-18 PROCEDURE — 52000 CYSTOURETHROSCOPY: CPT | Performed by: UROLOGY

## 2024-12-21 LAB — COMMENT: NORMAL

## 2025-01-15 PROBLEM — R73.03 PREDIABETES: Status: ACTIVE | Noted: 2025-01-15

## 2025-02-25 DIAGNOSIS — I10 ESSENTIAL HYPERTENSION: ICD-10-CM

## 2025-02-25 RX ORDER — CARVEDILOL 6.25 MG/1
6.25 TABLET ORAL 2 TIMES DAILY WITH MEALS
Qty: 60 TABLET | Refills: 5 | Status: SHIPPED | OUTPATIENT
Start: 2025-02-25

## 2025-02-27 DIAGNOSIS — E55.9 VITAMIN D DEFICIENCY: ICD-10-CM

## 2025-03-13 ENCOUNTER — OFFICE VISIT (OUTPATIENT)
Dept: INTERNAL MEDICINE CLINIC | Facility: CLINIC | Age: 45
End: 2025-03-13
Payer: COMMERCIAL

## 2025-03-13 VITALS
HEART RATE: 93 BPM | TEMPERATURE: 96.9 F | DIASTOLIC BLOOD PRESSURE: 80 MMHG | HEIGHT: 65 IN | SYSTOLIC BLOOD PRESSURE: 122 MMHG | OXYGEN SATURATION: 99 % | BODY MASS INDEX: 44.15 KG/M2 | WEIGHT: 265 LBS

## 2025-03-13 DIAGNOSIS — E66.813 CLASS 3 SEVERE OBESITY DUE TO EXCESS CALORIES WITH SERIOUS COMORBIDITY AND BODY MASS INDEX (BMI) OF 40.0 TO 44.9 IN ADULT (HCC): Primary | ICD-10-CM

## 2025-03-13 DIAGNOSIS — E66.01 CLASS 3 SEVERE OBESITY DUE TO EXCESS CALORIES WITH SERIOUS COMORBIDITY AND BODY MASS INDEX (BMI) OF 40.0 TO 44.9 IN ADULT (HCC): Primary | ICD-10-CM

## 2025-03-13 DIAGNOSIS — R21 RASH: ICD-10-CM

## 2025-03-13 PROCEDURE — 99214 OFFICE O/P EST MOD 30 MIN: CPT | Performed by: NURSE PRACTITIONER

## 2025-03-13 RX ORDER — NYSTATIN AND TRIAMCINOLONE ACETONIDE 100000; 1 [USP'U]/G; MG/G
CREAM TOPICAL
COMMUNITY
Start: 2025-02-17

## 2025-03-13 RX ORDER — FAMOTIDINE 20 MG/1
TABLET, FILM COATED ORAL
COMMUNITY
Start: 2025-03-06

## 2025-03-13 RX ORDER — TIRZEPATIDE 2.5 MG/.5ML
2.5 INJECTION, SOLUTION SUBCUTANEOUS WEEKLY
Qty: 6 ML | Refills: 0 | Status: SHIPPED | OUTPATIENT
Start: 2025-03-13 | End: 2025-04-10

## 2025-03-13 RX ORDER — FLUOROMETHOLONE 1 MG/ML
SUSPENSION/ DROPS OPHTHALMIC
COMMUNITY
Start: 2025-02-24

## 2025-03-13 RX ORDER — CYPROHEPTADINE HYDROCHLORIDE 4 MG/1
1 TABLET ORAL 2 TIMES DAILY
COMMUNITY
Start: 2025-03-06

## 2025-03-13 RX ORDER — TOBRAMYCIN AND DEXAMETHASONE 3; 1 MG/ML; MG/ML
SUSPENSION/ DROPS OPHTHALMIC
COMMUNITY
Start: 2025-02-10

## 2025-03-13 NOTE — PROGRESS NOTES
Name: Abby Jonas      : 1980      MRN: [id number]  Encounter Provider: ANDRE Christopher  Encounter Date: 3/13/2025   Encounter department: [facility] INTERNAL MEDICINE  :  Assessment & Plan  Class 3 severe obesity due to excess calories with serious comorbidity and body mass index (BMI) of 40.0 to 44.9 in adult (HCC)  Prior Authorization Clinical Questions for Weight Management Pharmacotherapy    2. Does the patient have a diagnosis of obesity, confirmed by a BMI greater than or equal to 30 kg/m^2?: Yes  3. Does the patient have a BMI of greater than or equal to 27 kg/m^2 with at least one weight-related comorbidity/risk factor/complication (e.g. diabetes, dyslipidemia, coronary artery disease)?: Yes  4. Weight-related co-morbidities/risk factors: prediabetes, dyslipidemia, hypertension  5. WEGOVY CVA Indication: Does patient have established documented cardiovascular disease (history of a prior heart attack (myocardial infarction), stroke, or symptomatic peripheral arterial disease (PAD)?: N/A  6. ZEPBOUND QUINTON Indication: Does patient have documented QUINTON diagnosed via sleep study (insurance will require copy of sleep study results for approval)?: No  7. Has the patient been on a weight loss regimen of low-calorie diet, increased physical activity, and lifestyle modifications for a minimum of 6 months?: Yes  8. Has the patient completed a comprehensive weight loss program (ie, Weight Watchers, Noom, Bariatrics, other rico on phone)? If so, what?: No  9. Does the patient have a history of type 2 diabetes?: No  10. Has the member tried and failed other weight loss medication within the past 12 months?: No  11. Will the member use requested medication in combination with another GLP agonist or weight loss drug?: No  12. Is the medication a controlled substance?: No     Baseline weight (in pounds): 265 lbs       Reviewed side effects of the medication.  Recommend a high protein high fiber  "diet. Count calories through an rico like myfitness pal, weight watchers, or [employer].   Increase physical activity to at least 30 minutes 4-5 times a week.   Orders:    tirzepatide (Zepbound) 2.5 mg/0.5 mL auto-injector; Inject 0.5 mL (2.5 mg total) under the skin once a week for 28 days    Rash  Rash is improving.  Continue antihistamine daily.  Apply cera ve 2-3 times daily.               History of Present Illness   Abby is here today with complaints of a rash.   Symptoms started over a week ago.  Rash is on her neck, abdomen, upper legs. Comes and goes on different areas.  No new lotions/soaps/detergents/medications.   It is itchy.  She did a teledoc appointment- started on antihistamines and triamcinolone.   She is using cera ve daily.   Rash seems to be improving the last few days.       She has struggled with weight loss since she was a child.   She walks [id number] steps daily.   She has counted calories in the past, not able to lose weight.  She continues to gain weight.           Review of Systems   Constitutional:  Negative for activity change and appetite change.   Respiratory:  Negative for shortness of breath.    Cardiovascular:  Negative for chest pain.   Gastrointestinal:  Negative for abdominal pain.   Skin:  Positive for rash.   Neurological:  Negative for dizziness, light-headedness and headaches.       Objective   /80   Pulse 93   Temp (!) 96.9 °F (36.1 °C)   Ht 5' 5\" (1.651 m)   Wt 120 kg (265 lb)   SpO2 99%   BMI 44.10 kg/m²      Physical Exam  Vitals reviewed.   Constitutional:       Appearance: Normal appearance.   HENT:      Head: Normocephalic and atraumatic.   Eyes:      Conjunctiva/sclera: Conjunctivae normal.   Pulmonary:      Effort: Pulmonary effort is normal.   Skin:     General: Skin is warm and dry.   Neurological:      Mental Status: She is alert and oriented to person, place, and time.   Psychiatric:         Mood and Affect: Mood normal.         Behavior: Behavior " normal.

## 2025-03-14 ENCOUNTER — TELEPHONE (OUTPATIENT)
Dept: UROLOGY | Facility: CLINIC | Age: 45
End: 2025-03-14

## 2025-03-14 ENCOUNTER — TELEPHONE (OUTPATIENT)
Dept: INTERNAL MEDICINE CLINIC | Facility: CLINIC | Age: 45
End: 2025-03-14

## 2025-03-14 NOTE — TELEPHONE ENCOUNTER
Appt with Candice on 3/7 needs to be rescheduled. Asked pt to [phone] so we can find her a new appt.

## 2025-03-14 NOTE — TELEPHONE ENCOUNTER
PA for zepbound  2.5 mg /0.5 ml [facility]     via    []CMM-KEY:   [x][facility]-Case ID # [id number]   []Availity-Auth ID # NDC #   []Faxed to plan   []Other website   []Phone call Case ID #     []PA sent as URGENT    All office notes, labs and other pertaining documents and studies sent. Clinical questions answered. Awaiting determination from insurance company.     Turnaround time for your insurance to make a decision on your Prior Authorization can take 7-21 business days.

## 2025-03-14 NOTE — TELEPHONE ENCOUNTER
PA for zepbound 2.5 mg/0.5 ml  APPROVED     Date(s) approved  February 12, 2025 to November 9, 2025     Case #[id number]     Patient advised by          [x]MyChart Message  []Phone call   [x]LMOM  []L/M to call office as no active Communication consent on file  []Unable to leave detailed message as VM not approved on Communication consent       Pharmacy advised by    [x]Fax  []Phone call  []Secure Chat    Specialty Pharmacy    []     Approval letter scanned into Media No

## 2025-03-20 DIAGNOSIS — I10 ESSENTIAL HYPERTENSION: ICD-10-CM

## 2025-03-21 RX ORDER — CARVEDILOL 6.25 MG/1
6.25 TABLET ORAL 2 TIMES DAILY WITH MEALS
Qty: 180 TABLET | Refills: 1 | Status: SHIPPED | OUTPATIENT
Start: 2025-03-21

## 2025-05-15 ENCOUNTER — PATIENT MESSAGE (OUTPATIENT)
Dept: INTERNAL MEDICINE CLINIC | Facility: CLINIC | Age: 45
End: 2025-05-15

## 2025-05-15 DIAGNOSIS — E03.9 ACQUIRED HYPOTHYROIDISM: ICD-10-CM

## 2025-05-16 ENCOUNTER — OFFICE VISIT (OUTPATIENT)
Dept: INTERNAL MEDICINE CLINIC | Facility: CLINIC | Age: 45
End: 2025-05-16
Payer: COMMERCIAL

## 2025-05-16 VITALS
BODY MASS INDEX: 43.99 KG/M2 | RESPIRATION RATE: 14 BRPM | WEIGHT: 264 LBS | HEART RATE: 80 BPM | TEMPERATURE: 97.9 F | HEIGHT: 65 IN | SYSTOLIC BLOOD PRESSURE: 136 MMHG | OXYGEN SATURATION: 99 % | DIASTOLIC BLOOD PRESSURE: 84 MMHG

## 2025-05-16 DIAGNOSIS — L50.1 IDIOPATHIC URTICARIA: Primary | ICD-10-CM

## 2025-05-16 DIAGNOSIS — E66.813 CLASS 3 SEVERE OBESITY DUE TO EXCESS CALORIES WITHOUT SERIOUS COMORBIDITY WITH BODY MASS INDEX (BMI) OF 40.0 TO 44.9 IN ADULT: ICD-10-CM

## 2025-05-16 PROCEDURE — 99214 OFFICE O/P EST MOD 30 MIN: CPT | Performed by: NURSE PRACTITIONER

## 2025-05-16 RX ORDER — METHYLPREDNISOLONE 4 MG/1
TABLET ORAL
Qty: 21 EACH | Refills: 0 | Status: SHIPPED | OUTPATIENT
Start: 2025-05-16

## 2025-05-16 RX ORDER — ATENOLOL 100 %
POWDER (GRAM) MISCELLANEOUS
COMMUNITY

## 2025-05-16 RX ORDER — LEVOTHYROXINE SODIUM 50 UG/1
50 TABLET ORAL DAILY
Qty: 90 TABLET | Refills: 1 | Status: SHIPPED | OUTPATIENT
Start: 2025-05-16

## 2025-05-16 RX ORDER — TIRZEPATIDE 2.5 MG/.5ML
INJECTION, SOLUTION SUBCUTANEOUS
COMMUNITY
Start: 2025-03-13

## 2025-05-16 RX ORDER — HYDROXYZINE HYDROCHLORIDE 25 MG/1
25 TABLET, FILM COATED ORAL EVERY 6 HOURS PRN
Qty: 30 TABLET | Refills: 0 | Status: SHIPPED | OUTPATIENT
Start: 2025-05-16

## 2025-05-16 RX ORDER — DROSPIRENONE AND ETHINYL ESTRADIOL 0.03MG-3MG
KIT ORAL
COMMUNITY

## 2025-05-16 RX ORDER — RABEPRAZOLE SODIUM 20 MG/1
TABLET, DELAYED RELEASE ORAL
COMMUNITY

## 2025-05-16 NOTE — PROGRESS NOTES
Name: Abby Jonas      : 1980      MRN: [id number]  Encounter Provider: ANDRE Christopher  Encounter Date: 2025   Encounter department: [facility] INTERNAL MEDICINE  :  Assessment & Plan  Idiopathic urticaria  Start steroids.   Atarax as needed for itching.  No improvement in the past with antihistamines, topical steroids, or elimination diets.   She has seen dermatology in the past. Referral provided today.   Orders:    methylPREDNISolone 4 MG tablet therapy pack; Use as directed on package    hydrOXYzine HCL (ATARAX) 25 mg tablet; Take 1 tablet (25 mg total) by mouth every 6 (six) hours as needed for itching    Ambulatory Referral to Dermatology; Future    Class 3 severe obesity due to excess calories without serious comorbidity with body mass index (BMI) of 40.0 to 44.9 in adult  Doing well on zepbound. Down about 5 lbs per her home scale.   Continue healthy diet and regular exercise.                 History of Present Illness   Abby is here today with complaints of a rash.   She has a history of idiopathic urticaria. She was having issues for years. She saw a few dermatologists however it went away for the last 5 days.  The rash returned in February. She had red raised patches that alternate different spots on her body. They are very itchy.   It resolved after a about a month and returned again about a week ago.  She has a rash on her arms and under arm area. The rash is very itchy.   She has been taking an antihistamines and a topical steroid without improvement.   No new soaps/lotions/detergents.   She has tried multiple elimination diets in the past.       She is on zepbound. She denies any side effects. Her appetite is less. She is frustrated with her weight loss.   She is down about 5 lbs per her home scale.   She is eating in a calorie deficit and getting at least 10,000 steps daily.         Review of Systems   Constitutional:  Negative for activity change and appetite  "change.   Respiratory:  Negative for shortness of breath.    Cardiovascular:  Negative for chest pain.   Gastrointestinal:  Negative for abdominal pain, constipation and diarrhea.   Musculoskeletal:  Negative for joint swelling.   Skin:  Positive for rash.   Neurological:  Negative for dizziness, light-headedness and headaches.   Psychiatric/Behavioral:  Negative for sleep disturbance. The patient is not nervous/anxious.        Objective   /84 (BP Location: Left arm, Patient Position: Sitting, Cuff Size: Large)   Pulse 80   Temp 97.9 °F (36.6 °C)   Resp 14   Ht 5' 5\" (1.651 m)   Wt 120 kg (264 lb)   SpO2 99%   BMI 43.93 kg/m²      Physical Exam  Vitals reviewed.   Constitutional:       Appearance: Normal appearance.   HENT:      Head: Normocephalic and atraumatic.     Eyes:      Conjunctiva/sclera: Conjunctivae normal.     Pulmonary:      Effort: Pulmonary effort is normal.     Skin:     Findings: Rash present.      Comments: Erythematous papules/macules on arms, neck, and right axillary area.      Neurological:      Mental Status: She is alert and oriented to person, place, and time.     Psychiatric:         Mood and Affect: Mood normal.         Behavior: Behavior normal.     I have spent a total time of 30 minutes in caring for this patient on the day of the visit/encounter including Risks and benefits of tx options, Instructions for management, Patient and family education, and Obtaining or reviewing history  .     "

## 2025-05-16 NOTE — ASSESSMENT & PLAN NOTE
Doing well on zepbound. Down about 5 lbs per her home scale.   Continue healthy diet and regular exercise.

## 2025-05-28 DIAGNOSIS — E55.9 VITAMIN D DEFICIENCY: ICD-10-CM

## 2025-05-28 DIAGNOSIS — N20.0 NEPHROLITHIASIS: ICD-10-CM

## 2025-05-28 DIAGNOSIS — I10 ESSENTIAL HYPERTENSION: Primary | ICD-10-CM

## 2025-06-10 DIAGNOSIS — E66.813 CLASS 3 SEVERE OBESITY DUE TO EXCESS CALORIES WITHOUT SERIOUS COMORBIDITY WITH BODY MASS INDEX (BMI) OF 40.0 TO 44.9 IN ADULT: Primary | ICD-10-CM

## 2025-06-10 DIAGNOSIS — E66.813 CLASS 3 SEVERE OBESITY DUE TO EXCESS CALORIES WITHOUT SERIOUS COMORBIDITY WITH BODY MASS INDEX (BMI) OF 40.0 TO 44.9 IN ADULT: ICD-10-CM

## 2025-06-10 RX ORDER — TIRZEPATIDE 2.5 MG/.5ML
INJECTION, SOLUTION SUBCUTANEOUS
Refills: 0 | OUTPATIENT
Start: 2025-06-10

## 2025-06-10 RX ORDER — TIRZEPATIDE 5 MG/.5ML
5 INJECTION, SOLUTION SUBCUTANEOUS WEEKLY
Qty: 2 ML | Refills: 0 | Status: SHIPPED | OUTPATIENT
Start: 2025-06-10 | End: 2025-06-10 | Stop reason: SDUPTHER

## 2025-06-10 RX ORDER — TIRZEPATIDE 5 MG/.5ML
5 INJECTION, SOLUTION SUBCUTANEOUS WEEKLY
Qty: 6 ML | Refills: 0 | Status: SHIPPED | OUTPATIENT
Start: 2025-06-10

## 2025-06-10 NOTE — TELEPHONE ENCOUNTER
This medication is only given monthly as dosages may change and should go up every 1-2 months.   I sent in zepbound 5 mg weekly to [facility].

## 2025-06-10 NOTE — TELEPHONE ENCOUNTER
Patient called the RX Refill Line. Message is being forwarded to the office.     Patient is stating she had conversation with Sindhu Roe and she said that Sindhu thought it was a good idea to stay on the same dose for 3 months, that is why she is asking for 3 month supply to [facility].     Please contact patient at  [phone]

## 2025-06-15 DIAGNOSIS — E03.9 ACQUIRED HYPOTHYROIDISM: ICD-10-CM

## 2025-06-15 RX ORDER — LEVOTHYROXINE SODIUM 50 UG/1
50 TABLET ORAL DAILY
Qty: 90 TABLET | Refills: 1 | Status: SHIPPED | OUTPATIENT
Start: 2025-06-15

## 2025-06-20 DIAGNOSIS — E53.8 VITAMIN B12 DEFICIENCY: ICD-10-CM

## 2025-06-20 RX ORDER — LANOLIN ALCOHOL/MO/W.PET/CERES
1000 CREAM (GRAM) TOPICAL DAILY
Qty: 90 TABLET | Refills: 1 | Status: SHIPPED | OUTPATIENT
Start: 2025-06-20

## 2025-07-15 ENCOUNTER — OFFICE VISIT (OUTPATIENT)
Dept: INTERNAL MEDICINE CLINIC | Facility: CLINIC | Age: 45
End: 2025-07-15
Payer: COMMERCIAL

## 2025-07-15 VITALS
BODY MASS INDEX: 42.65 KG/M2 | RESPIRATION RATE: 97 BRPM | WEIGHT: 256 LBS | HEIGHT: 65 IN | TEMPERATURE: 99.1 F | SYSTOLIC BLOOD PRESSURE: 148 MMHG | HEART RATE: 80 BPM | DIASTOLIC BLOOD PRESSURE: 86 MMHG

## 2025-07-15 DIAGNOSIS — L02.91 ABSCESS: ICD-10-CM

## 2025-07-15 DIAGNOSIS — E66.813 CLASS 3 SEVERE OBESITY DUE TO EXCESS CALORIES WITHOUT SERIOUS COMORBIDITY WITH BODY MASS INDEX (BMI) OF 40.0 TO 44.9 IN ADULT: Primary | ICD-10-CM

## 2025-07-15 DIAGNOSIS — R73.03 PREDIABETES: ICD-10-CM

## 2025-07-15 DIAGNOSIS — E03.9 ACQUIRED HYPOTHYROIDISM: ICD-10-CM

## 2025-07-15 PROCEDURE — 99214 OFFICE O/P EST MOD 30 MIN: CPT | Performed by: NURSE PRACTITIONER

## 2025-07-17 RX ORDER — LEVOTHYROXINE SODIUM 50 MCG
50 TABLET ORAL DAILY
Qty: 30 TABLET | Refills: 5 | Status: SHIPPED | OUTPATIENT
Start: 2025-07-17